# Patient Record
Sex: FEMALE | Race: WHITE | NOT HISPANIC OR LATINO | Employment: PART TIME | URBAN - METROPOLITAN AREA
[De-identification: names, ages, dates, MRNs, and addresses within clinical notes are randomized per-mention and may not be internally consistent; named-entity substitution may affect disease eponyms.]

---

## 2018-02-19 ENCOUNTER — TRANSCRIBE ORDERS (OUTPATIENT)
Dept: ADMINISTRATIVE | Facility: HOSPITAL | Age: 48
End: 2018-02-19

## 2018-02-19 DIAGNOSIS — Z12.31 VISIT FOR SCREENING MAMMOGRAM: Primary | ICD-10-CM

## 2018-02-26 ENCOUNTER — HOSPITAL ENCOUNTER (OUTPATIENT)
Dept: MAMMOGRAPHY | Facility: HOSPITAL | Age: 48
Discharge: HOME | End: 2018-02-26
Admitting: INTERNAL MEDICINE

## 2018-02-26 DIAGNOSIS — Z12.31 VISIT FOR SCREENING MAMMOGRAM: ICD-10-CM

## 2018-02-26 PROCEDURE — 77067 SCR MAMMO BI INCL CAD: CPT

## 2018-02-26 PROCEDURE — 77063 BREAST TOMOSYNTHESIS BI: CPT

## 2018-06-06 ENCOUNTER — TRANSCRIBE ORDERS (OUTPATIENT)
Dept: ADMINISTRATIVE | Facility: HOSPITAL | Age: 48
End: 2018-06-06

## 2018-06-06 DIAGNOSIS — N28.89 RENAL MASS: Primary | ICD-10-CM

## 2018-06-11 ENCOUNTER — HOSPITAL ENCOUNTER (OUTPATIENT)
Dept: CT IMAGING | Facility: HOSPITAL | Age: 48
Discharge: HOME | End: 2018-06-11
Admitting: INTERNAL MEDICINE

## 2018-06-11 DIAGNOSIS — N28.89 RENAL MASS: ICD-10-CM

## 2018-06-11 PROCEDURE — 0 IOPAMIDOL PER 1 ML: Performed by: INTERNAL MEDICINE

## 2018-06-11 PROCEDURE — 74178 CT ABD&PLV WO CNTR FLWD CNTR: CPT

## 2018-06-11 PROCEDURE — 0 DIATRIZOATE MEGLUMINE & SODIUM PER 1 ML: Performed by: INTERNAL MEDICINE

## 2018-06-11 RX ADMIN — DIATRIZOATE MEGLUMINE AND DIATRIZOATE SODIUM 30 ML: 600; 100 SOLUTION ORAL; RECTAL at 11:15

## 2018-06-11 RX ADMIN — IOPAMIDOL 100 ML: 755 INJECTION, SOLUTION INTRAVENOUS at 12:46

## 2018-06-22 ENCOUNTER — TRANSCRIBE ORDERS (OUTPATIENT)
Dept: ADMINISTRATIVE | Facility: HOSPITAL | Age: 48
End: 2018-06-22

## 2018-06-22 DIAGNOSIS — R10.9 ABDOMINAL PAIN, UNSPECIFIED ABDOMINAL LOCATION: Primary | ICD-10-CM

## 2018-06-26 ENCOUNTER — HOSPITAL ENCOUNTER (OUTPATIENT)
Dept: ULTRASOUND IMAGING | Facility: HOSPITAL | Age: 48
Discharge: HOME | End: 2018-06-26
Admitting: INTERNAL MEDICINE

## 2018-06-26 ENCOUNTER — HOSPITAL ENCOUNTER (OUTPATIENT)
Dept: ULTRASOUND IMAGING | Facility: HOSPITAL | Age: 48
Discharge: HOME | End: 2018-06-26

## 2018-06-26 DIAGNOSIS — R10.9 ABDOMINAL PAIN, UNSPECIFIED ABDOMINAL LOCATION: ICD-10-CM

## 2018-06-26 PROCEDURE — 76830 TRANSVAGINAL US NON-OB: CPT

## 2018-06-26 PROCEDURE — 76856 US EXAM PELVIC COMPLETE: CPT

## 2019-10-08 PROBLEM — R39.89 BLADDER PAIN: Status: ACTIVE | Noted: 2019-10-08

## 2019-10-08 PROBLEM — N39.3 FEMALE STRESS INCONTINENCE: Status: ACTIVE | Noted: 2019-10-08

## 2019-10-08 PROBLEM — N81.6 RECTOCELE: Status: ACTIVE | Noted: 2019-10-08

## 2019-10-08 PROBLEM — N81.5 VAGINAL ENTEROCELE: Status: ACTIVE | Noted: 2019-10-08

## 2019-10-08 PROBLEM — N81.89 LOSS OF PERINEAL BODY, FEMALE: Status: ACTIVE | Noted: 2019-10-08

## 2019-10-08 PROBLEM — N81.82 INCOMPETENCE OR WEAKENING OF PUBOCERVICAL TISSUE: Status: ACTIVE | Noted: 2019-10-08

## 2019-10-08 PROBLEM — R15.0 INCOMPLETE DEFECATION: Status: ACTIVE | Noted: 2019-10-08

## 2019-10-08 PROBLEM — N36.9 DISORDER OF URETHRA: Status: ACTIVE | Noted: 2019-10-08

## 2019-10-08 PROBLEM — N81.83 INCOMPETENCE OR WEAKENING OF RECTOVAGINAL TISSUE: Status: ACTIVE | Noted: 2019-10-08

## 2019-10-08 PROBLEM — N94.89 HIGH-TONE PELVIC FLOOR DYSFUNCTION: Status: ACTIVE | Noted: 2019-10-08

## 2019-10-08 PROBLEM — N81.12 CYSTOCELE, LATERAL: Status: ACTIVE | Noted: 2019-10-08

## 2019-10-08 PROBLEM — N30.10 CHRONIC INTERSTITIAL CYSTITIS: Status: ACTIVE | Noted: 2019-10-08

## 2019-10-08 PROBLEM — N99.3 PROLAPSE OF VAGINAL VAULT AFTER HYSTERECTOMY: Status: ACTIVE | Noted: 2019-10-08

## 2019-10-08 PROBLEM — N36.42 INTRINSIC SPHINCTER DEFICIENCY (ISD): Status: ACTIVE | Noted: 2019-10-08

## 2019-10-08 PROBLEM — N81.11 CYSTOCELE, MIDLINE: Status: ACTIVE | Noted: 2019-10-08

## 2019-10-08 PROBLEM — M62.89 HIGH-TONE PELVIC FLOOR DYSFUNCTION: Status: ACTIVE | Noted: 2019-10-08

## 2019-10-08 NOTE — PLAN OF CARE
Laparoscopic uterosacral ligament colpopexy  Laparoscopic paravaginal repair  Laparoscopic abdominal enterocele repair  Pubovaginal sling  Revision/removal of pubovaginal sling  Laparoscopic bilateral salpingectomy  Posterior colporrhaphy  Anterior colporrhaphy  Extraperitoneal colpopexy sacrospinous ligament fixation  Cystourethroscopy  And any other indicated procedures

## 2019-10-08 NOTE — H&P
Female Pelvic Medicine and Reconstructive Surgery   History & Physical    Patient Identification:  Name: Nicki Aguilar Age: 49 y.o. Sex: female :  1970 MRN: Female Pelvic Medicine and Reconstructive Surgery   History & Physical    Patient Identification:  Name: Nicki Aguilar Age: 49 y.o. Sex: female :  1970 MRN: [id number]                            Problem List:    Prolapse of vaginal vault after hysterectomy    Cystocele, midline    Cystocele, lateral    Vaginal enterocele    Rectocele    Loss of perineal body, female    Female stress incontinence    Intrinsic sphincter deficiency (ISD)    Chronic interstitial cystitis    Incomplete defecation    Incompetence or weakening of pubocervical tissue    Incompetence or weakening of rectovaginal tissue    High-tone pelvic floor dysfunction    Bladder pain    Disorder of urethra    Past Medical History:  No past medical history on file.  Past Surgical History:  No past surgical history on file.   Home Meds:  No medications prior to admission.     Current Meds:   No current facility-administered medications for this encounter.   No current outpatient medications on file.  Allergies:  Allergies not on file  Immunizations:    There is no immunization history on file for this patient.  Social History:   Social History     Tobacco Use   • Smoking status: Not on file   Substance Use Topics   • Alcohol use: Not on file      Family History:  Family History   Problem Relation Age of Onset   • Breast cancer Neg Hx         Review of Systems  Constitutional:  Denies fever or chills   Eyes:  Denies change in visual acuity   HEENT:  Denies nasal congestion or sore throat   Respiratory:  Denies cough or shortness of breath   Cardiovascular:  Denies chest pain or edema   GI:  Denies abdominal pain, nausea, vomiting, bloody stools or diarrhea   :  Denies dysuria   Musculoskeletal:  Denies back pain or joint pain   Integument:  Denies rash   Neurologic:  Denies  headache, focal weakness or sensory changes   Endocrine:  Denies polyuria or polydipsia   Lymphatic:  Denies swollen glands   Psychiatric:  Denies depression or anxiety       Objective:  tMax 24 hrs: No data recorded.    Vitals Ranges:   BP: ()/()   Arterial Line BP: ()/()     Exam:  Vital Signs: There were no vitals taken for this visit.  Constitutional:  Well developed, well nourished, no acute distress, non-toxic appearance    GI:  Soft, nondistended, normal bowel sounds, nontender, no organomegaly, no mass, no rebound, no guarding   :  No costovertebral angle tenderness   Musculoskeletal:  No edema, no tenderness, no deformities. Back- no tenderness  Integument:  Well hydrated, no rash   Lymphatic:  No lymphadenopathy noted   Neurologic:  Alert & oriented x 3,  Pelvic:     POPQ  +1  +1  -3  6  2  10  +1  +1  Na    LPP 41 cm H2O  PFS 15.2 ml/s    Assessment:    Prolapse of vaginal vault after hysterectomy    Cystocele, midline    Cystocele, lateral    Vaginal enterocele    Rectocele    Loss of perineal body, female    Female stress incontinence    Intrinsic sphincter deficiency (ISD)    Chronic interstitial cystitis    Incomplete defecation    Incompetence or weakening of pubocervical tissue    Incompetence or weakening of rectovaginal tissue    High-tone pelvic floor dysfunction    Bladder pain    Disorder of urethra      Plan:  Laparoscopic uterosacral ligament colpopexy  Laparoscopic paravaginal repair  Laparoscopic abdominal enterocele repair  Pubovaginal sling  Revision/removal of pubovaginal sling  Laparoscopic bilateral salpingectomy  Posterior colporrhaphy  Anterior colporrhaphy  Extraperitoneal colpopexy sacrospinous ligament fixation  Cystourethroscopy  And any other indicated procedures    Jamila Rod MD  10/8/2019

## 2019-10-10 ENCOUNTER — APPOINTMENT (OUTPATIENT)
Dept: PREADMISSION TESTING | Facility: HOSPITAL | Age: 49
End: 2019-10-10

## 2019-10-10 ENCOUNTER — HOSPITAL ENCOUNTER (OUTPATIENT)
Dept: GENERAL RADIOLOGY | Facility: HOSPITAL | Age: 49
Discharge: HOME | End: 2019-10-10
Admitting: OBSTETRICS & GYNECOLOGY

## 2019-10-10 VITALS
OXYGEN SATURATION: 98 % | HEART RATE: 73 BPM | TEMPERATURE: 97.8 F | DIASTOLIC BLOOD PRESSURE: 75 MMHG | WEIGHT: 213 LBS | RESPIRATION RATE: 16 BRPM | HEIGHT: 64 IN | BODY MASS INDEX: 36.37 KG/M2 | SYSTOLIC BLOOD PRESSURE: 116 MMHG

## 2019-10-10 LAB
ABO GROUP BLD: NORMAL
ANION GAP SERPL CALCULATED.3IONS-SCNC: 14 MMOL/L (ref 5–15)
BASOPHILS # BLD AUTO: 0.03 10*3/MM3 (ref 0–0.2)
BASOPHILS NFR BLD AUTO: 0.6 % (ref 0–1.5)
BILIRUB UR QL STRIP: NEGATIVE
BLD GP AB SCN SERPL QL: NEGATIVE
BUN BLD-MCNC: 14 MG/DL (ref 6–20)
BUN/CREAT SERPL: 18.7 (ref 7–25)
CALCIUM SPEC-SCNC: 8.9 MG/DL (ref 8.6–10.5)
CHLORIDE SERPL-SCNC: 100 MMOL/L (ref 98–107)
CLARITY UR: CLEAR
CO2 SERPL-SCNC: 24 MMOL/L (ref 22–29)
COLOR UR: YELLOW
CREAT BLD-MCNC: 0.75 MG/DL (ref 0.57–1)
DEPRECATED RDW RBC AUTO: 42 FL (ref 37–54)
EOSINOPHIL # BLD AUTO: 0.09 10*3/MM3 (ref 0–0.4)
EOSINOPHIL NFR BLD AUTO: 1.9 % (ref 0.3–6.2)
ERYTHROCYTE [DISTWIDTH] IN BLOOD BY AUTOMATED COUNT: 13.6 % (ref 12.3–15.4)
GFR SERPL CREATININE-BSD FRML MDRD: 82 ML/MIN/1.73
GLUCOSE BLD-MCNC: 111 MG/DL (ref 65–99)
GLUCOSE UR STRIP-MCNC: NEGATIVE MG/DL
HCT VFR BLD AUTO: 38.8 % (ref 34–46.6)
HGB BLD-MCNC: 12.8 G/DL (ref 12–15.9)
HGB UR QL STRIP.AUTO: NEGATIVE
IMM GRANULOCYTES # BLD AUTO: 0.03 10*3/MM3 (ref 0–0.05)
IMM GRANULOCYTES NFR BLD AUTO: 0.6 % (ref 0–0.5)
KETONES UR QL STRIP: NEGATIVE
LEUKOCYTE ESTERASE UR QL STRIP.AUTO: NEGATIVE
LYMPHOCYTES # BLD AUTO: 0.99 10*3/MM3 (ref 0.7–3.1)
LYMPHOCYTES NFR BLD AUTO: 21.2 % (ref 19.6–45.3)
MAGNESIUM SERPL-MCNC: 2.2 MG/DL (ref 1.6–2.6)
MCH RBC QN AUTO: 27.8 PG (ref 26.6–33)
MCHC RBC AUTO-ENTMCNC: 33 G/DL (ref 31.5–35.7)
MCV RBC AUTO: 84.3 FL (ref 79–97)
MONOCYTES # BLD AUTO: 0.28 10*3/MM3 (ref 0.1–0.9)
MONOCYTES NFR BLD AUTO: 6 % (ref 5–12)
NEUTROPHILS # BLD AUTO: 3.25 10*3/MM3 (ref 1.7–7)
NEUTROPHILS NFR BLD AUTO: 69.7 % (ref 42.7–76)
NITRITE UR QL STRIP: NEGATIVE
NRBC BLD AUTO-RTO: 0 /100 WBC (ref 0–0.2)
PH UR STRIP.AUTO: 7 [PH] (ref 5–8)
PLATELET # BLD AUTO: 157 10*3/MM3 (ref 140–450)
PMV BLD AUTO: 11.2 FL (ref 6–12)
POTASSIUM BLD-SCNC: 4.1 MMOL/L (ref 3.5–5.2)
PROT UR QL STRIP: NEGATIVE
RBC # BLD AUTO: 4.6 10*6/MM3 (ref 3.77–5.28)
RH BLD: POSITIVE
SODIUM BLD-SCNC: 138 MMOL/L (ref 136–145)
SP GR UR STRIP: 1.01 (ref 1–1.03)
T&S EXPIRATION DATE: NORMAL
UROBILINOGEN UR QL STRIP: NORMAL
WBC NRBC COR # BLD: 4.67 10*3/MM3 (ref 3.4–10.8)

## 2019-10-10 PROCEDURE — 93005 ELECTROCARDIOGRAM TRACING: CPT

## 2019-10-10 PROCEDURE — 80048 BASIC METABOLIC PNL TOTAL CA: CPT | Performed by: OBSTETRICS & GYNECOLOGY

## 2019-10-10 PROCEDURE — 85025 COMPLETE CBC W/AUTO DIFF WBC: CPT | Performed by: OBSTETRICS & GYNECOLOGY

## 2019-10-10 PROCEDURE — 83735 ASSAY OF MAGNESIUM: CPT | Performed by: OBSTETRICS & GYNECOLOGY

## 2019-10-10 PROCEDURE — 86850 RBC ANTIBODY SCREEN: CPT | Performed by: OBSTETRICS & GYNECOLOGY

## 2019-10-10 PROCEDURE — 93010 ELECTROCARDIOGRAM REPORT: CPT | Performed by: INTERNAL MEDICINE

## 2019-10-10 PROCEDURE — 81003 URINALYSIS AUTO W/O SCOPE: CPT | Performed by: OBSTETRICS & GYNECOLOGY

## 2019-10-10 PROCEDURE — 71046 X-RAY EXAM CHEST 2 VIEWS: CPT

## 2019-10-10 PROCEDURE — 86900 BLOOD TYPING SEROLOGIC ABO: CPT | Performed by: OBSTETRICS & GYNECOLOGY

## 2019-10-10 PROCEDURE — 36415 COLL VENOUS BLD VENIPUNCTURE: CPT

## 2019-10-10 PROCEDURE — 86901 BLOOD TYPING SEROLOGIC RH(D): CPT | Performed by: OBSTETRICS & GYNECOLOGY

## 2019-10-10 RX ORDER — METHENAMINE, SODIUM PHOSPHATE, MONOBASIC, MONOHYDRATE, PHENYL SALICYLATE, METHYLENE BLUE, AND HYOSCYAMINE SULFATE 120; 40.8; 36; 10; .12 MG/1; MG/1; MG/1; MG/1; MG/1
1 CAPSULE ORAL
COMMUNITY

## 2019-10-10 RX ORDER — MELATONIN
1000 EVERY OTHER DAY
COMMUNITY

## 2019-10-10 RX ORDER — CHLORHEXIDINE GLUCONATE 500 MG/1
CLOTH TOPICAL
COMMUNITY
End: 2019-10-17 | Stop reason: HOSPADM

## 2019-10-10 NOTE — DISCHARGE INSTRUCTIONS
Take the following medications the morning of surgery with a small sip of water:    NONE    CALL OFFICE FOR ARRIVAL TIME.    General Instructions:  • Do not eat solid food after midnight the night before surgery.  • You may drink clear liquids day of surgery but must stop at least one hour before your hospital arrival time.  • It is beneficial for you to have a clear drink that contains carbohydrates the day of surgery.  We suggest a 12 to 20 ounce bottle of Gatorade or Powerade for non-diabetic patients or a 12 to 20 ounce bottle of G2 or Powerade Zero for diabetic patients. (Pediatric patients, are not advised to drink a 12 to 20 ounce carbohydrate drink)    Clear liquids are liquids you can see through.  Nothing red in color.     Plain water                               Sports drinks  Sodas                                   Gelatin (Jell-O)  Fruit juices without pulp such as white grape juice and apple juice  Popsicles that contain no fruit or yogurt  Tea or coffee (no cream or milk added)  Gatorade / Powerade  G2 / Powerade Zero    • Infants may have breast milk up to four hours before surgery.  • Infants drinking formula may drink formula up to six hours before surgery.   • Patients who avoid smoking, chewing tobacco and alcohol for 4 weeks prior to surgery have a reduced risk of post-operative complications.  Quit smoking as many days before surgery as you can.  • Do not smoke, use chewing tobacco or drink alcohol the day of surgery.   • If applicable bring your C-PAP/ BI-PAP machine.  • Bring any papers given to you in the doctor’s office.  • Wear clean comfortable clothes.  • Do not wear contact lenses, false eyelashes or make-up.  Bring a case for your glasses.   • Bring crutches or walker if applicable.  • Remove all piercings.  Leave jewelry and any other valuables at home.  • Hair extensions with metal clips must be removed prior to surgery.  • The Pre-Admission Testing nurse will instruct you to bring  medications if unable to obtain an accurate list in Pre-Admission Testing.        If you were given a blood bank ID arm band remember to bring it with you the day of surgery.    Preventing a Surgical Site Infection:  • For 2 to 3 days before surgery, avoid shaving with a razor because the razor can irritate skin and make it easier to develop an infection.    • Any areas of open skin can increase the risk of a post-operative wound infection by allowing bacteria to enter and travel throughout the body.  Notify your surgeon if you have any skin wounds / rashes even if it is not near the expected surgical site.  The area will need assessed to determine if surgery should be delayed until it is healed.  • The night prior to surgery sleep in a clean bed with clean clothing.  Do not allow pets to sleep with you.  • Shower on the morning of surgery using a fresh bar of anti-bacterial soap (such as Dial) and clean washcloth.  Dry with a clean towel and dress in clean clothing.  • Ask your surgeon if you will be receiving antibiotics prior to surgery.  • Make sure you, your family, and all healthcare providers clean their hands with soap and water or an alcohol based hand  before caring for you or your wound.    Day of surgery:  Your arrival time is approximately two hours before your scheduled surgery time.  Upon arrival, a Pre-op nurse and Anesthesiologist will review your health history, obtain vital signs, and answer questions you may have.  The only belongings needed at this time will be a list of your home medications and if applicable your C-PAP/BI-PAP machine.  If you are staying overnight your family can leave the rest of your belongings in the car and bring them to your room later.  A Pre-op nurse will start an IV and you may receive medication in preparation for surgery, including something to help you relax.  Your family will be able to see you in the Pre-op area.  Two visitors at a time will be allowed in  the Pre-op room.  While you are in surgery your family should notify the waiting room  if they leave the waiting room area and provide a contact phone number.    Please be aware that surgery does come with discomfort.  We want to make every effort to control your discomfort so please discuss any uncontrolled symptoms with your nurse.   Your doctor will most likely have prescribed pain medications.      If you are going home after surgery you will receive individualized written care instructions before being discharged.  A responsible adult must drive you to and from the hospital on the day of your surgery and stay with you for 24 hours.    If you are staying overnight following surgery, you will be transported to your hospital room following the recovery period.  [facility] has all private rooms.    You have received a list of surgical assistants for your reference.  If you have any questions please call Pre-Admission Testing at [phone].  Deductibles and co-payments are collected on the day of service. Please be prepared to pay the required co-pay, deductible or deposit on the day of service as defined by your plan.  2% CHLORAHEXIDINE GLUCONATE* CLOTH  Preparing or “prepping” skin before surgery can reduce the risk of infection at the surgical site. To make the process easier, [facility] has chosen disposable cloths moistened with a rinse-free, 2% Chlorhexidine Gluconate (CHG) antiseptic solution. The steps below outline the prepping process and should be carefully followed.        Use the prep cloth on the area that is circled in the diagram             Directions Night before Surgery  1) Shower using a fresh bar of anti-bacterial soap (such as Dial) and clean washcloth.  Use a clean towel to completely dry your skin.  2) Do not use any lotions, oils or creams on your skin.  3) Open the package and remove 1 cloth, wipe your skin for 30 seconds in a circular motion.  Allow  to dry for 3 minutes.  4) Repeat #3 with second cloth.  5) Do not touch your eyes, ears, or mouth with the prep cloth.  6) Allow the wet prep solution to air dry.  7) Discard the prep cloth and wash your hands with soap and water.   8) Dress in clean bed clothes and sleep on fresh clean bed sheets.   9) You may experience some temporary itching after the prep.    Directions Day of Surgery  1) Repeat steps 1,2,3,4,5,6,7, and 9.   2) Dress in clean clothes before coming to the hospital.

## 2019-10-15 ENCOUNTER — ANESTHESIA EVENT (OUTPATIENT)
Dept: PERIOP | Facility: HOSPITAL | Age: 49
End: 2019-10-15

## 2019-10-15 ENCOUNTER — ANESTHESIA (OUTPATIENT)
Dept: PERIOP | Facility: HOSPITAL | Age: 49
End: 2019-10-15

## 2019-10-15 ENCOUNTER — HOSPITAL ENCOUNTER (INPATIENT)
Facility: HOSPITAL | Age: 49
LOS: 2 days | Discharge: HOME | End: 2019-10-17
Attending: OBSTETRICS & GYNECOLOGY | Admitting: OBSTETRICS & GYNECOLOGY

## 2019-10-15 DIAGNOSIS — N83.8 CYST OF FALLOPIAN TUBE: ICD-10-CM

## 2019-10-15 PROBLEM — N81.9 VAGINAL VAULT PROLAPSE: Status: ACTIVE | Noted: 2019-10-15

## 2019-10-15 PROCEDURE — 0UPH4JZ REMOVAL OF SYNTHETIC SUBSTITUTE FROM VAGINA AND CUL-DE-SAC, PERCUTANEOUS ENDOSCOPIC APPROACH: ICD-10-PCS | Performed by: OBSTETRICS & GYNECOLOGY

## 2019-10-15 PROCEDURE — 88300 SURGICAL PATH GROSS: CPT | Performed by: OBSTETRICS & GYNECOLOGY

## 2019-10-15 PROCEDURE — 25010000002 PROPOFOL 10 MG/ML EMULSION: Performed by: ANESTHESIOLOGY

## 2019-10-15 PROCEDURE — 25010000003 CEFAZOLIN IN DEXTROSE 2-4 GM/100ML-% SOLUTION: Performed by: OBSTETRICS & GYNECOLOGY

## 2019-10-15 PROCEDURE — 88304 TISSUE EXAM BY PATHOLOGIST: CPT | Performed by: OBSTETRICS & GYNECOLOGY

## 2019-10-15 PROCEDURE — 0UT74ZZ RESECTION OF BILATERAL FALLOPIAN TUBES, PERCUTANEOUS ENDOSCOPIC APPROACH: ICD-10-PCS | Performed by: OBSTETRICS & GYNECOLOGY

## 2019-10-15 PROCEDURE — 25010000002 HYDROMORPHONE PER 4 MG: Performed by: NURSE ANESTHETIST, CERTIFIED REGISTERED

## 2019-10-15 PROCEDURE — 25010000002 KETOROLAC TROMETHAMINE PER 15 MG: Performed by: NURSE ANESTHETIST, CERTIFIED REGISTERED

## 2019-10-15 PROCEDURE — 25010000002 MIDAZOLAM PER 1 MG: Performed by: ANESTHESIOLOGY

## 2019-10-15 PROCEDURE — G0378 HOSPITAL OBSERVATION PER HR: HCPCS

## 2019-10-15 PROCEDURE — 25010000002 FENTANYL CITRATE (PF) 100 MCG/2ML SOLUTION: Performed by: ANESTHESIOLOGY

## 2019-10-15 PROCEDURE — 25010000002 SUCCINYLCHOLINE PER 20 MG: Performed by: ANESTHESIOLOGY

## 2019-10-15 PROCEDURE — 0UQF4ZZ REPAIR CUL-DE-SAC, PERCUTANEOUS ENDOSCOPIC APPROACH: ICD-10-PCS | Performed by: OBSTETRICS & GYNECOLOGY

## 2019-10-15 PROCEDURE — 0JQC0ZZ REPAIR PELVIC REGION SUBCUTANEOUS TISSUE AND FASCIA, OPEN APPROACH: ICD-10-PCS | Performed by: OBSTETRICS & GYNECOLOGY

## 2019-10-15 PROCEDURE — 25010000002 NEOSTIGMINE PER 0.5 MG: Performed by: NURSE ANESTHETIST, CERTIFIED REGISTERED

## 2019-10-15 PROCEDURE — 25010000002 MORPHINE PER 10 MG: Performed by: OBSTETRICS & GYNECOLOGY

## 2019-10-15 PROCEDURE — 25010000002 DEXAMETHASONE PER 1 MG: Performed by: NURSE ANESTHETIST, CERTIFIED REGISTERED

## 2019-10-15 PROCEDURE — 0TSD4ZZ REPOSITION URETHRA, PERCUTANEOUS ENDOSCOPIC APPROACH: ICD-10-PCS | Performed by: OBSTETRICS & GYNECOLOGY

## 2019-10-15 PROCEDURE — 25010000002 ONDANSETRON PER 1 MG: Performed by: NURSE ANESTHETIST, CERTIFIED REGISTERED

## 2019-10-15 PROCEDURE — 0USG4ZZ REPOSITION VAGINA, PERCUTANEOUS ENDOSCOPIC APPROACH: ICD-10-PCS | Performed by: OBSTETRICS & GYNECOLOGY

## 2019-10-15 PROCEDURE — 0TJB8ZZ INSPECTION OF BLADDER, VIA NATURAL OR ARTIFICIAL OPENING ENDOSCOPIC: ICD-10-PCS | Performed by: OBSTETRICS & GYNECOLOGY

## 2019-10-15 PROCEDURE — 25010000002 FENTANYL CITRATE (PF) 100 MCG/2ML SOLUTION: Performed by: NURSE ANESTHETIST, CERTIFIED REGISTERED

## 2019-10-15 DEVICE — GRFT TISS STRATTICE FIRM 6X10CM: Type: IMPLANTABLE DEVICE | Status: FUNCTIONAL

## 2019-10-15 RX ORDER — MAGNESIUM HYDROXIDE 1200 MG/15ML
LIQUID ORAL AS NEEDED
Status: DISCONTINUED | OUTPATIENT
Start: 2019-10-15 | End: 2019-10-15 | Stop reason: HOSPADM

## 2019-10-15 RX ORDER — KETOROLAC TROMETHAMINE 30 MG/ML
INJECTION, SOLUTION INTRAMUSCULAR; INTRAVENOUS AS NEEDED
Status: DISCONTINUED | OUTPATIENT
Start: 2019-10-15 | End: 2019-10-15 | Stop reason: SURG

## 2019-10-15 RX ORDER — PHENAZOPYRIDINE HYDROCHLORIDE 200 MG/1
200 TABLET, FILM COATED ORAL ONCE
Status: COMPLETED | OUTPATIENT
Start: 2019-10-15 | End: 2019-10-15

## 2019-10-15 RX ORDER — SODIUM CHLORIDE 0.9 % (FLUSH) 0.9 %
3 SYRINGE (ML) INJECTION EVERY 12 HOURS SCHEDULED
Status: DISCONTINUED | OUTPATIENT
Start: 2019-10-15 | End: 2019-10-15 | Stop reason: HOSPADM

## 2019-10-15 RX ORDER — GLYCOPYRROLATE 0.2 MG/ML
INJECTION INTRAMUSCULAR; INTRAVENOUS AS NEEDED
Status: DISCONTINUED | OUTPATIENT
Start: 2019-10-15 | End: 2019-10-15 | Stop reason: SURG

## 2019-10-15 RX ORDER — IBUPROFEN 400 MG/1
400 TABLET ORAL
Status: DISCONTINUED | OUTPATIENT
Start: 2019-10-15 | End: 2019-10-17 | Stop reason: HOSPADM

## 2019-10-15 RX ORDER — FLUMAZENIL 0.1 MG/ML
0.2 INJECTION INTRAVENOUS AS NEEDED
Status: DISCONTINUED | OUTPATIENT
Start: 2019-10-15 | End: 2019-10-15 | Stop reason: HOSPADM

## 2019-10-15 RX ORDER — DIPHENHYDRAMINE HYDROCHLORIDE 50 MG/ML
12.5 INJECTION INTRAMUSCULAR; INTRAVENOUS
Status: DISCONTINUED | OUTPATIENT
Start: 2019-10-15 | End: 2019-10-15 | Stop reason: HOSPADM

## 2019-10-15 RX ORDER — MIDAZOLAM HYDROCHLORIDE 1 MG/ML
1 INJECTION INTRAMUSCULAR; INTRAVENOUS
Status: DISCONTINUED | OUTPATIENT
Start: 2019-10-15 | End: 2019-10-15 | Stop reason: HOSPADM

## 2019-10-15 RX ORDER — METHENAMINE, SODIUM PHOSPHATE, MONOBASIC, MONOHYDRATE, PHENYL SALICYLATE, METHYLENE BLUE, AND HYOSCYAMINE SULFATE 120; 40.8; 36; 10; .12 MG/1; MG/1; MG/1; MG/1; MG/1
1 CAPSULE ORAL
Status: DISCONTINUED | OUTPATIENT
Start: 2019-10-15 | End: 2019-10-16

## 2019-10-15 RX ORDER — SODIUM CHLORIDE 0.9 % (FLUSH) 0.9 %
3 SYRINGE (ML) INJECTION EVERY 12 HOURS SCHEDULED
Status: DISCONTINUED | OUTPATIENT
Start: 2019-10-15 | End: 2019-10-15

## 2019-10-15 RX ORDER — PROMETHAZINE HYDROCHLORIDE 25 MG/ML
12.5 INJECTION, SOLUTION INTRAMUSCULAR; INTRAVENOUS EVERY 6 HOURS PRN
Status: DISCONTINUED | OUTPATIENT
Start: 2019-10-15 | End: 2019-10-17 | Stop reason: HOSPADM

## 2019-10-15 RX ORDER — LIDOCAINE HYDROCHLORIDE 20 MG/ML
INJECTION, SOLUTION INFILTRATION; PERINEURAL AS NEEDED
Status: DISCONTINUED | OUTPATIENT
Start: 2019-10-15 | End: 2019-10-15 | Stop reason: SURG

## 2019-10-15 RX ORDER — PROMETHAZINE HYDROCHLORIDE 25 MG/1
25 TABLET ORAL ONCE AS NEEDED
Status: DISCONTINUED | OUTPATIENT
Start: 2019-10-15 | End: 2019-10-15 | Stop reason: HOSPADM

## 2019-10-15 RX ORDER — ROCURONIUM BROMIDE 10 MG/ML
INJECTION, SOLUTION INTRAVENOUS AS NEEDED
Status: DISCONTINUED | OUTPATIENT
Start: 2019-10-15 | End: 2019-10-15 | Stop reason: SURG

## 2019-10-15 RX ORDER — DIPHENHYDRAMINE HCL 25 MG
25 CAPSULE ORAL
Status: DISCONTINUED | OUTPATIENT
Start: 2019-10-15 | End: 2019-10-15 | Stop reason: HOSPADM

## 2019-10-15 RX ORDER — FENTANYL CITRATE 50 UG/ML
50 INJECTION, SOLUTION INTRAMUSCULAR; INTRAVENOUS
Status: DISCONTINUED | OUTPATIENT
Start: 2019-10-15 | End: 2019-10-15 | Stop reason: HOSPADM

## 2019-10-15 RX ORDER — OXYCODONE AND ACETAMINOPHEN 7.5; 325 MG/1; MG/1
1 TABLET ORAL ONCE AS NEEDED
Status: DISCONTINUED | OUTPATIENT
Start: 2019-10-15 | End: 2019-10-15 | Stop reason: HOSPADM

## 2019-10-15 RX ORDER — PHENAZOPYRIDINE HYDROCHLORIDE 200 MG/1
200 TABLET, FILM COATED ORAL ONCE
Status: DISCONTINUED | OUTPATIENT
Start: 2019-10-15 | End: 2019-10-15

## 2019-10-15 RX ORDER — ONDANSETRON 2 MG/ML
4 INJECTION INTRAMUSCULAR; INTRAVENOUS ONCE AS NEEDED
Status: DISCONTINUED | OUTPATIENT
Start: 2019-10-15 | End: 2019-10-15 | Stop reason: HOSPADM

## 2019-10-15 RX ORDER — CEFAZOLIN SODIUM 2 G/100ML
2 INJECTION, SOLUTION INTRAVENOUS ONCE
Status: COMPLETED | OUTPATIENT
Start: 2019-10-15 | End: 2019-10-15

## 2019-10-15 RX ORDER — MEPERIDINE HYDROCHLORIDE 25 MG/ML
12.5 INJECTION INTRAMUSCULAR; INTRAVENOUS; SUBCUTANEOUS
Status: DISCONTINUED | OUTPATIENT
Start: 2019-10-15 | End: 2019-10-15 | Stop reason: HOSPADM

## 2019-10-15 RX ORDER — LIDOCAINE HYDROCHLORIDE 10 MG/ML
0.5 INJECTION, SOLUTION EPIDURAL; INFILTRATION; INTRACAUDAL; PERINEURAL ONCE AS NEEDED
Status: DISCONTINUED | OUTPATIENT
Start: 2019-10-15 | End: 2019-10-15 | Stop reason: HOSPADM

## 2019-10-15 RX ORDER — LEVOFLOXACIN 5 MG/ML
500 INJECTION, SOLUTION INTRAVENOUS ONCE
Status: DISCONTINUED | OUTPATIENT
Start: 2019-10-15 | End: 2019-10-15

## 2019-10-15 RX ORDER — FENTANYL CITRATE 50 UG/ML
INJECTION, SOLUTION INTRAMUSCULAR; INTRAVENOUS AS NEEDED
Status: DISCONTINUED | OUTPATIENT
Start: 2019-10-15 | End: 2019-10-15 | Stop reason: SURG

## 2019-10-15 RX ORDER — SODIUM CHLORIDE 0.9 % (FLUSH) 0.9 %
10 SYRINGE (ML) INJECTION AS NEEDED
Status: DISCONTINUED | OUTPATIENT
Start: 2019-10-15 | End: 2019-10-15

## 2019-10-15 RX ORDER — DEXAMETHASONE SODIUM PHOSPHATE 10 MG/ML
INJECTION INTRAMUSCULAR; INTRAVENOUS AS NEEDED
Status: DISCONTINUED | OUTPATIENT
Start: 2019-10-15 | End: 2019-10-15 | Stop reason: SURG

## 2019-10-15 RX ORDER — NALOXONE HCL 0.4 MG/ML
0.4 VIAL (ML) INJECTION
Status: DISCONTINUED | OUTPATIENT
Start: 2019-10-15 | End: 2019-10-17 | Stop reason: HOSPADM

## 2019-10-15 RX ORDER — SODIUM CHLORIDE 0.9 % (FLUSH) 0.9 %
3-10 SYRINGE (ML) INJECTION AS NEEDED
Status: DISCONTINUED | OUTPATIENT
Start: 2019-10-15 | End: 2019-10-15 | Stop reason: HOSPADM

## 2019-10-15 RX ORDER — HYDROCODONE BITARTRATE AND ACETAMINOPHEN 7.5; 325 MG/1; MG/1
1 TABLET ORAL ONCE AS NEEDED
Status: DISCONTINUED | OUTPATIENT
Start: 2019-10-15 | End: 2019-10-15 | Stop reason: HOSPADM

## 2019-10-15 RX ORDER — SODIUM CHLORIDE 0.9 % (FLUSH) 0.9 %
10 SYRINGE (ML) INJECTION AS NEEDED
Status: DISCONTINUED | OUTPATIENT
Start: 2019-10-15 | End: 2019-10-15 | Stop reason: HOSPADM

## 2019-10-15 RX ORDER — MORPHINE SULFATE 2 MG/ML
1 INJECTION, SOLUTION INTRAMUSCULAR; INTRAVENOUS
Status: DISCONTINUED | OUTPATIENT
Start: 2019-10-15 | End: 2019-10-17 | Stop reason: HOSPADM

## 2019-10-15 RX ORDER — ESTRADIOL 0.1 MG/G
CREAM VAGINAL AS NEEDED
Status: DISCONTINUED | OUTPATIENT
Start: 2019-10-15 | End: 2019-10-15 | Stop reason: HOSPADM

## 2019-10-15 RX ORDER — DOCUSATE SODIUM 100 MG/1
100 CAPSULE, LIQUID FILLED ORAL 2 TIMES DAILY
Status: DISCONTINUED | OUTPATIENT
Start: 2019-10-15 | End: 2019-10-17 | Stop reason: HOSPADM

## 2019-10-15 RX ORDER — PROMETHAZINE HYDROCHLORIDE 25 MG/1
25 SUPPOSITORY RECTAL ONCE AS NEEDED
Status: DISCONTINUED | OUTPATIENT
Start: 2019-10-15 | End: 2019-10-15 | Stop reason: HOSPADM

## 2019-10-15 RX ORDER — PROPOFOL 10 MG/ML
VIAL (ML) INTRAVENOUS AS NEEDED
Status: DISCONTINUED | OUTPATIENT
Start: 2019-10-15 | End: 2019-10-15 | Stop reason: SURG

## 2019-10-15 RX ORDER — MORPHINE SULFATE 2 MG/ML
2 INJECTION, SOLUTION INTRAMUSCULAR; INTRAVENOUS
Status: DISCONTINUED | OUTPATIENT
Start: 2019-10-15 | End: 2019-10-17 | Stop reason: HOSPADM

## 2019-10-15 RX ORDER — FAMOTIDINE 10 MG/ML
20 INJECTION, SOLUTION INTRAVENOUS ONCE
Status: COMPLETED | OUTPATIENT
Start: 2019-10-15 | End: 2019-10-15

## 2019-10-15 RX ORDER — PROMETHAZINE HYDROCHLORIDE 25 MG/ML
12.5 INJECTION, SOLUTION INTRAMUSCULAR; INTRAVENOUS ONCE AS NEEDED
Status: DISCONTINUED | OUTPATIENT
Start: 2019-10-15 | End: 2019-10-15 | Stop reason: HOSPADM

## 2019-10-15 RX ORDER — OXYCODONE AND ACETAMINOPHEN 10; 325 MG/1; MG/1
1 TABLET ORAL EVERY 4 HOURS PRN
Status: DISCONTINUED | OUTPATIENT
Start: 2019-10-15 | End: 2019-10-17 | Stop reason: HOSPADM

## 2019-10-15 RX ORDER — CLINDAMYCIN PHOSPHATE 900 MG/50ML
900 INJECTION INTRAVENOUS ONCE
Status: DISCONTINUED | OUTPATIENT
Start: 2019-10-15 | End: 2019-10-15

## 2019-10-15 RX ORDER — MELATONIN
1000 EVERY OTHER DAY
Status: DISCONTINUED | OUTPATIENT
Start: 2019-10-15 | End: 2019-10-17 | Stop reason: HOSPADM

## 2019-10-15 RX ORDER — CEFAZOLIN SODIUM 2 G/100ML
2 INJECTION, SOLUTION INTRAVENOUS EVERY 8 HOURS
Status: COMPLETED | OUTPATIENT
Start: 2019-10-15 | End: 2019-10-16

## 2019-10-15 RX ORDER — ONDANSETRON 2 MG/ML
INJECTION INTRAMUSCULAR; INTRAVENOUS AS NEEDED
Status: DISCONTINUED | OUTPATIENT
Start: 2019-10-15 | End: 2019-10-15 | Stop reason: SURG

## 2019-10-15 RX ORDER — HYDRALAZINE HYDROCHLORIDE 20 MG/ML
5 INJECTION INTRAMUSCULAR; INTRAVENOUS
Status: DISCONTINUED | OUTPATIENT
Start: 2019-10-15 | End: 2019-10-15 | Stop reason: HOSPADM

## 2019-10-15 RX ORDER — ACETAMINOPHEN 325 MG/1
650 TABLET ORAL ONCE AS NEEDED
Status: DISCONTINUED | OUTPATIENT
Start: 2019-10-15 | End: 2019-10-15 | Stop reason: HOSPADM

## 2019-10-15 RX ORDER — ALBUTEROL SULFATE 2.5 MG/3ML
2.5 SOLUTION RESPIRATORY (INHALATION) ONCE AS NEEDED
Status: DISCONTINUED | OUTPATIENT
Start: 2019-10-15 | End: 2019-10-15 | Stop reason: HOSPADM

## 2019-10-15 RX ORDER — ONDANSETRON 4 MG/1
4 TABLET, FILM COATED ORAL EVERY 6 HOURS PRN
Status: DISCONTINUED | OUTPATIENT
Start: 2019-10-15 | End: 2019-10-17 | Stop reason: HOSPADM

## 2019-10-15 RX ORDER — NALOXONE HCL 0.4 MG/ML
0.2 VIAL (ML) INJECTION AS NEEDED
Status: DISCONTINUED | OUTPATIENT
Start: 2019-10-15 | End: 2019-10-15 | Stop reason: HOSPADM

## 2019-10-15 RX ORDER — HYDROMORPHONE HCL 110MG/55ML
PATIENT CONTROLLED ANALGESIA SYRINGE INTRAVENOUS AS NEEDED
Status: DISCONTINUED | OUTPATIENT
Start: 2019-10-15 | End: 2019-10-15 | Stop reason: SURG

## 2019-10-15 RX ORDER — ONDANSETRON 2 MG/ML
4 INJECTION INTRAMUSCULAR; INTRAVENOUS EVERY 6 HOURS PRN
Status: DISCONTINUED | OUTPATIENT
Start: 2019-10-15 | End: 2019-10-17 | Stop reason: HOSPADM

## 2019-10-15 RX ORDER — HYDROMORPHONE HYDROCHLORIDE 1 MG/ML
0.5 INJECTION, SOLUTION INTRAMUSCULAR; INTRAVENOUS; SUBCUTANEOUS
Status: DISCONTINUED | OUTPATIENT
Start: 2019-10-15 | End: 2019-10-15 | Stop reason: HOSPADM

## 2019-10-15 RX ORDER — MIDAZOLAM HYDROCHLORIDE 1 MG/ML
2 INJECTION INTRAMUSCULAR; INTRAVENOUS
Status: DISCONTINUED | OUTPATIENT
Start: 2019-10-15 | End: 2019-10-15 | Stop reason: HOSPADM

## 2019-10-15 RX ORDER — OXYCODONE HYDROCHLORIDE AND ACETAMINOPHEN 5; 325 MG/1; MG/1
1 TABLET ORAL EVERY 4 HOURS PRN
Status: DISCONTINUED | OUTPATIENT
Start: 2019-10-15 | End: 2019-10-17 | Stop reason: HOSPADM

## 2019-10-15 RX ORDER — BUPIVACAINE HYDROCHLORIDE AND EPINEPHRINE 2.5; 5 MG/ML; UG/ML
INJECTION, SOLUTION INFILTRATION; PERINEURAL AS NEEDED
Status: DISCONTINUED | OUTPATIENT
Start: 2019-10-15 | End: 2019-10-15 | Stop reason: HOSPADM

## 2019-10-15 RX ORDER — PROMETHAZINE HYDROCHLORIDE 25 MG/ML
6.25 INJECTION, SOLUTION INTRAMUSCULAR; INTRAVENOUS
Status: DISCONTINUED | OUTPATIENT
Start: 2019-10-15 | End: 2019-10-15 | Stop reason: HOSPADM

## 2019-10-15 RX ORDER — SODIUM CHLORIDE, SODIUM LACTATE, POTASSIUM CHLORIDE, CALCIUM CHLORIDE 600; 310; 30; 20 MG/100ML; MG/100ML; MG/100ML; MG/100ML
9 INJECTION, SOLUTION INTRAVENOUS CONTINUOUS
Status: DISCONTINUED | OUTPATIENT
Start: 2019-10-15 | End: 2019-10-17 | Stop reason: HOSPADM

## 2019-10-15 RX ORDER — DEXTROSE AND SODIUM CHLORIDE 5; .45 G/100ML; G/100ML
100 INJECTION, SOLUTION INTRAVENOUS CONTINUOUS
Status: DISCONTINUED | OUTPATIENT
Start: 2019-10-15 | End: 2019-10-17 | Stop reason: HOSPADM

## 2019-10-15 RX ORDER — EPHEDRINE SULFATE 50 MG/ML
5 INJECTION, SOLUTION INTRAVENOUS ONCE AS NEEDED
Status: DISCONTINUED | OUTPATIENT
Start: 2019-10-15 | End: 2019-10-15 | Stop reason: HOSPADM

## 2019-10-15 RX ORDER — SUCCINYLCHOLINE CHLORIDE 20 MG/ML
INJECTION INTRAMUSCULAR; INTRAVENOUS AS NEEDED
Status: DISCONTINUED | OUTPATIENT
Start: 2019-10-15 | End: 2019-10-15 | Stop reason: SURG

## 2019-10-15 RX ORDER — SODIUM CHLORIDE 9 MG/ML
INJECTION, SOLUTION INTRAVENOUS AS NEEDED
Status: DISCONTINUED | OUTPATIENT
Start: 2019-10-15 | End: 2019-10-15 | Stop reason: HOSPADM

## 2019-10-15 RX ADMIN — SODIUM CHLORIDE, POTASSIUM CHLORIDE, SODIUM LACTATE AND CALCIUM CHLORIDE 9 ML/HR: 600; 310; 30; 20 INJECTION, SOLUTION INTRAVENOUS at 08:47

## 2019-10-15 RX ADMIN — CEFAZOLIN SODIUM 2 G: 2 INJECTION, SOLUTION INTRAVENOUS at 10:48

## 2019-10-15 RX ADMIN — PROPOFOL 180 MG: 10 INJECTION, EMULSION INTRAVENOUS at 10:57

## 2019-10-15 RX ADMIN — METHENAMINE, SODIUM PHOSPHATE, MONOBASIC, MONOHYDRATE, PHENYL SALICYLATE, METHYLENE BLUE, AND HYOSCYAMINE SULFATE 118 MG: 120; 40.8; 36; 10; .12 CAPSULE ORAL at 21:38

## 2019-10-15 RX ADMIN — MIDAZOLAM 1 MG: 1 INJECTION INTRAMUSCULAR; INTRAVENOUS at 08:47

## 2019-10-15 RX ADMIN — FENTANYL CITRATE 50 MCG: 50 INJECTION INTRAMUSCULAR; INTRAVENOUS at 17:23

## 2019-10-15 RX ADMIN — DEXTROSE AND SODIUM CHLORIDE 100 ML/HR: 5; 450 INJECTION, SOLUTION INTRAVENOUS at 21:38

## 2019-10-15 RX ADMIN — FAMOTIDINE 20 MG: 10 INJECTION INTRAVENOUS at 08:47

## 2019-10-15 RX ADMIN — SODIUM CHLORIDE, POTASSIUM CHLORIDE, SODIUM LACTATE AND CALCIUM CHLORIDE: 600; 310; 30; 20 INJECTION, SOLUTION INTRAVENOUS at 12:28

## 2019-10-15 RX ADMIN — CEFAZOLIN SODIUM 2 G: 2 INJECTION, SOLUTION INTRAVENOUS at 23:15

## 2019-10-15 RX ADMIN — FENTANYL CITRATE 50 MCG: 50 INJECTION INTRAMUSCULAR; INTRAVENOUS at 11:05

## 2019-10-15 RX ADMIN — NEOSTIGMINE METHYLSULFATE 2 MG: 1 INJECTION INTRAMUSCULAR; INTRAVENOUS; SUBCUTANEOUS at 16:01

## 2019-10-15 RX ADMIN — ROCURONIUM BROMIDE 45 MG: 10 INJECTION INTRAVENOUS at 11:05

## 2019-10-15 RX ADMIN — MORPHINE SULFATE 1 MG: 2 INJECTION, SOLUTION INTRAMUSCULAR; INTRAVENOUS at 22:21

## 2019-10-15 RX ADMIN — ROCURONIUM BROMIDE 5 MG: 10 INJECTION INTRAVENOUS at 10:57

## 2019-10-15 RX ADMIN — GLYCOPYRROLATE 0.4 MG: 0.2 INJECTION INTRAMUSCULAR; INTRAVENOUS at 15:59

## 2019-10-15 RX ADMIN — GLYCOPYRROLATE 0.2 MG: 0.2 INJECTION INTRAMUSCULAR; INTRAVENOUS at 10:50

## 2019-10-15 RX ADMIN — DEXAMETHASONE SODIUM PHOSPHATE 8 MG: 10 INJECTION INTRAMUSCULAR; INTRAVENOUS at 11:35

## 2019-10-15 RX ADMIN — CEFAZOLIN SODIUM 2 G: 2 INJECTION, SOLUTION INTRAVENOUS at 15:30

## 2019-10-15 RX ADMIN — HYDROMORPHONE HYDROCHLORIDE 0.5 MG: 2 INJECTION INTRAMUSCULAR; INTRAVENOUS; SUBCUTANEOUS at 13:55

## 2019-10-15 RX ADMIN — PROPOFOL 20 MG: 10 INJECTION, EMULSION INTRAVENOUS at 11:05

## 2019-10-15 RX ADMIN — SUCCINYLCHOLINE CHLORIDE 120 MG: 20 INJECTION, SOLUTION INTRAMUSCULAR; INTRAVENOUS; PARENTERAL at 10:57

## 2019-10-15 RX ADMIN — ONDANSETRON 4 MG: 2 INJECTION INTRAMUSCULAR; INTRAVENOUS at 15:54

## 2019-10-15 RX ADMIN — OXYCODONE HYDROCHLORIDE AND ACETAMINOPHEN 1 TABLET: 10; 325 TABLET ORAL at 21:38

## 2019-10-15 RX ADMIN — FENTANYL CITRATE 50 MCG: 50 INJECTION INTRAMUSCULAR; INTRAVENOUS at 17:10

## 2019-10-15 RX ADMIN — HYDROMORPHONE HYDROCHLORIDE 0.5 MG: 2 INJECTION INTRAMUSCULAR; INTRAVENOUS; SUBCUTANEOUS at 15:36

## 2019-10-15 RX ADMIN — ROCURONIUM BROMIDE 20 MG: 10 INJECTION INTRAVENOUS at 15:15

## 2019-10-15 RX ADMIN — IBUPROFEN 400 MG: 400 TABLET, FILM COATED ORAL at 19:45

## 2019-10-15 RX ADMIN — LIDOCAINE HYDROCHLORIDE 100 MG: 20 INJECTION, SOLUTION INFILTRATION; PERINEURAL at 10:57

## 2019-10-15 RX ADMIN — ROCURONIUM BROMIDE 20 MG: 10 INJECTION INTRAVENOUS at 13:34

## 2019-10-15 RX ADMIN — MORPHINE SULFATE 2 MG: 2 INJECTION, SOLUTION INTRAMUSCULAR; INTRAVENOUS at 18:44

## 2019-10-15 RX ADMIN — FENTANYL CITRATE 50 MCG: 50 INJECTION INTRAMUSCULAR; INTRAVENOUS at 10:57

## 2019-10-15 RX ADMIN — KETOROLAC TROMETHAMINE 30 MG: 30 INJECTION, SOLUTION INTRAMUSCULAR; INTRAVENOUS at 16:06

## 2019-10-15 RX ADMIN — DOCUSATE SODIUM 100 MG: 100 CAPSULE, LIQUID FILLED ORAL at 21:38

## 2019-10-15 RX ADMIN — ROCURONIUM BROMIDE 20 MG: 10 INJECTION INTRAVENOUS at 12:08

## 2019-10-15 RX ADMIN — PHENAZOPYRIDINE 200 MG: 200 TABLET ORAL at 08:34

## 2019-10-15 NOTE — ANESTHESIA PROCEDURE NOTES
Airway  Urgency: elective    Date/Time: 10/15/2019 10:59 AM  Airway not difficult    General Information and Staff    Patient location during procedure: OR  Anesthesiologist: Isaac Campa MD  CRNA: Kayden Langston CRNA    Indications and Patient Condition  Indications for airway management: airway protection    Preoxygenated: yes  MILS maintained throughout  Mask difficulty assessment: 1 - vent by mask    Final Airway Details  Final airway type: endotracheal airway      Successful airway: ETT  Cuffed: yes   Successful intubation technique: direct laryngoscopy  Endotracheal tube insertion site: oral  Blade: Reg  Blade size: 3  ETT size (mm): 7.0  Cormack-Lehane Classification: grade I - full view of glottis  Placement verified by: chest auscultation and capnometry   Inital cuff pressure (cm H2O): 22  Cuff volume (mL): 7  Measured from: lips  ETT/EBT  to lips (cm): 21  Number of attempts at approach: 1

## 2019-10-15 NOTE — ANESTHESIA PREPROCEDURE EVALUATION
Anesthesia Evaluation     Patient summary reviewed and Nursing notes reviewed   no history of anesthetic complications:  NPO Solid Status: > 8 hours  NPO Liquid Status: > 2 hours           Airway   Mallampati: II  TM distance: >3 FB  Neck ROM: full  No difficulty expected  Dental - normal exam         Pulmonary - negative pulmonary ROS and normal exam    breath sounds clear to auscultation  Cardiovascular - negative cardio ROS and normal exam    Rhythm: regular  Rate: normal        Neuro/Psych- negative ROS  GI/Hepatic/Renal/Endo    (+) obesity,       Musculoskeletal (-) negative ROS    Abdominal   (+) obese,    Substance History - negative use     OB/GYN          Other - negative ROS                       Anesthesia Plan    ASA 2     general     intravenous induction   Anesthetic plan, all risks, benefits, and alternatives have been provided, discussed and informed consent has been obtained with: patient.

## 2019-10-15 NOTE — H&P
Female Pelvic Medicine and Reconstructive Surgery   History & Physical    Patient Identification:  Name: Nicki Aguilar Age: 49 y.o. Sex: female :  1970 MRN: Female Pelvic Medicine and Reconstructive Surgery   History & Physical    Patient Identification:  Name: Nicki Aguilar Age: 49 y.o. Sex: female :  1970 MRN: [id number]                            Problem List:    Prolapse of vaginal vault after hysterectomy    Cystocele, midline    Cystocele, lateral    Vaginal enterocele    Rectocele    Loss of perineal body, female    Female stress incontinence    Intrinsic sphincter deficiency (ISD)    Chronic interstitial cystitis    Incomplete defecation    Incompetence or weakening of pubocervical tissue    Incompetence or weakening of rectovaginal tissue    High-tone pelvic floor dysfunction    Bladder pain    Disorder of urethra    Vaginal vault prolapse    Past Medical History:  Past Medical History:   Diagnosis Date   • Female bladder prolapse      Past Surgical History:  Past Surgical History:   Procedure Laterality Date   • BLADDER SUSPENSION     • HYSTERECTOMY     • INCONTINENCE SURGERY     • TUBAL ABDOMINAL LIGATION        Home Meds:  Medications Prior to Admission   Medication Sig Dispense Refill Last Dose   • Chlorhexidine Gluconate Cloth 2 % pads Apply  topically. As directed pre op   10/15/2019 at 0630   • cholecalciferol (VITAMIN D3) 1000 units tablet Take 1,000 Units by mouth Every Other Day.   10/12/2019   • lisdexamfetamine (VYVANSE) 30 MG capsule Take 30 mg by mouth Daily As Needed TO HOD 3 DAYS BEFORE SURGERY   10/12/2019   • uribel (URO-MP) 118 MG capsule capsule Take 1 capsule by mouth 2 (Two) Times a Day.   10/12/2019 at 1900     Current Meds:     Current Facility-Administered Medications:   •  ceFAZolin in dextrose (ANCEF) IVPB solution 2 g, 2 g, Intravenous, Once, Jamila Rod MD  •  lactated ringers infusion, 9 mL/hr, Intravenous, Continuous, Candida So MD, Last  Rate: 9 mL/hr at 10/15/19 0847, 9 mL/hr at 10/15/19 0847  •  lidocaine PF 1% (XYLOCAINE) injection 0.5 mL, 0.5 mL, Injection, Once PRN, Candida So MD  •  midazolam (VERSED) injection 1 mg, 1 mg, Intravenous, Q5 Min PRN, 1 mg at 10/15/19 0847 **OR** midazolam (VERSED) injection 2 mg, 2 mg, Intravenous, Q5 Min PRN, Candida So MD  •  sodium chloride 0.9 % flush 10 mL, 10 mL, Intravenous, PRN, Jamila Rod MD  •  sodium chloride 0.9 % flush 3 mL, 3 mL, Intravenous, Q12H, Jamila Rod MD  •  sodium chloride 0.9 % flush 3 mL, 3 mL, Intravenous, Q12H, Candida So MD  •  sodium chloride 0.9 % flush 3-10 mL, 3-10 mL, Intravenous, PRN, Candida So MD  Allergies:  No Known Allergies  Immunizations:    There is no immunization history on file for this patient.  Social History:   Social History     Tobacco Use   • Smoking status: Never Smoker   • Smokeless tobacco: Never Used   Substance Use Topics   • Alcohol use: No     Frequency: Never      Family History:  Family History   Problem Relation Age of Onset   • Breast cancer Neg Hx    • Malig Hyperthermia Neg Hx         Review of Systems  Constitutional:  Denies fever or chills   Eyes:  Denies change in visual acuity   HEENT:  Denies nasal congestion or sore throat   Respiratory:  Denies cough or shortness of breath   Cardiovascular:  Denies chest pain or edema   GI:  Denies abdominal pain, nausea, vomiting, bloody stools or diarrhea   :  Denies dysuria   Musculoskeletal:  Denies back pain or joint pain   Integument:  Denies rash   Neurologic:  Denies headache, focal weakness or sensory changes   Endocrine:  Denies polyuria or polydipsia   Lymphatic:  Denies swollen glands   Psychiatric:  Denies depression or anxiety       Objective:  tMax 24 hrs: Temp (24hrs), Av.7 °F (36.5 °C), Min:97.7 °F (36.5 °C), Max:97.7 °F (36.5 °C)    Vitals Ranges:   Temp:  [97.7 °F (36.5 °C)] 97.7 °F (36.5 °C)  Heart Rate:  [78-80] 78  Resp:  [16-18] 16  BP: (129)/(80)  "129/80    Exam:  Vital Signs: /80 (BP Location: Right arm, Patient Position: Lying)   Pulse 78   Temp 97.7 °F (36.5 °C) (Oral)   Resp 16   Ht 162.6 cm (64\")   Wt 94.6 kg (208 lb 8.9 oz)   SpO2 97%   BMI 35.80 kg/m²   Constitutional:  Well developed, well nourished, no acute distress, non-toxic appearance    GI:  Soft, nondistended, normal bowel sounds, nontender, no organomegaly, no mass, no rebound, no guarding   :  No costovertebral angle tenderness   Musculoskeletal:  No edema, no tenderness, no deformities. Back- no tenderness  Integument:  Well hydrated, no rash   Lymphatic:  No lymphadenopathy noted   Neurologic:  Alert & oriented x 3,  Pelvic:   POPQ  +1  +1  -3  6  2  10  +1  +1  Na     LPP 41 cm H2O  PFS 15.2 ml/s     Assessment:    Prolapse of vaginal vault after hysterectomy    Cystocele, midline    Cystocele, lateral    Vaginal enterocele    Rectocele    Loss of perineal body, female    Female stress incontinence    Intrinsic sphincter deficiency (ISD)    Chronic interstitial cystitis    Incomplete defecation    Incompetence or weakening of pubocervical tissue    Incompetence or weakening of rectovaginal tissue    High-tone pelvic floor dysfunction    Bladder pain    Disorder of urethra        Plan:  Laparoscopic uterosacral ligament colpopexy  Laparoscopic paravaginal repair  Laparoscopic abdominal enterocele repair  Pubovaginal sling  Revision/removal of pubovaginal sling  Laparoscopic bilateral salpingectomy  Posterior colporrhaphy  Anterior colporrhaphy  Extraperitoneal colpopexy sacrospinous ligament fixation  Cystourethroscopy  And any other indicated procedures       Jamila Rod MD  10/15/2019    "

## 2019-10-15 NOTE — ANESTHESIA POSTPROCEDURE EVALUATION
"Patient: Nicki Aguilar    Procedure Summary     Date:  10/15/19 Room / Location:  [facility] OR [address] [place] [address] MAIN OR    Anesthesia Start:  1048 Anesthesia Stop:  1626    Procedures:       LAPAROSCOPIC UTEROSACRAL LIGAMENT SUSPENSION/SACRAL COLPOPEXY, LAPAROSCOPIC PARAVAGINAL REPAIR, COMBINED ANTEROPOSTER COLPORRHAPHY WITH ENTEROCELE REPAIR, VAGINAL APPROACH LAPAROSCOPIC/ABDOMINAL ENTEROCELE REPAIR, LAPAROCOPIC BILATERAL SALPINGECTOMY(LEAVE OVARIES IF LOOK) POSTERIOR COLPORRHAPHY, ANTERIOR COLPORRHAPHY, EXTRAPERITONEAL COLPOPEXY SACROSPINOUS LIGAMENT FIXATION, PERINEOPLASTY/PERINEORRHAPHY, CYSTOURETHROSCOPY AND ANY OTHER INDICATED PROCEDURES, PUBO VAGINAL SLING (N/A Abdomen)      POSTERIOR COLPORRHAPHY, (N/A Vagina) Diagnosis:      Surgeon:  Jamila Rod MD Provider:  Isaac Campa MD    Anesthesia Type:  general ASA Status:  2          Anesthesia Type: general  Last vitals  BP   117/76 (10/15/19 1820)   Temp   37 °C (98.6 °F) (10/15/19 1820)   Pulse   85 (10/15/19 1820)   Resp   16 (10/15/19 1820)     SpO2   98 % (10/15/19 1820)     Post Anesthesia Care and Evaluation    Patient location during evaluation: PACU  Patient participation: complete - patient participated  Level of consciousness: awake and alert  Pain score: 0  Pain management: adequate  Airway patency: patent  Anesthetic complications: No anesthetic complications    Cardiovascular status: acceptable  Respiratory status: acceptable  Hydration status: acceptable    Comments: /76 (BP Location: Right arm, Patient Position: Lying)   Pulse 85   Temp 37 °C (98.6 °F) (Oral)   Resp 16   Ht 162.6 cm (64\")   Wt 94.6 kg (208 lb 8.9 oz)   SpO2 98%   BMI 35.80 kg/m²       "

## 2019-10-16 PROBLEM — N83.8 CYST OF FALLOPIAN TUBE: Status: ACTIVE | Noted: 2019-10-16

## 2019-10-16 LAB
HCT VFR BLD AUTO: 32 % (ref 34–46.6)
HGB BLD-MCNC: 10.5 G/DL (ref 12–15.9)

## 2019-10-16 PROCEDURE — 85018 HEMOGLOBIN: CPT | Performed by: OBSTETRICS & GYNECOLOGY

## 2019-10-16 PROCEDURE — 25010000002 MORPHINE PER 10 MG: Performed by: OBSTETRICS & GYNECOLOGY

## 2019-10-16 PROCEDURE — 63710000001 PROMETHAZINE PER 12.5 MG: Performed by: OBSTETRICS & GYNECOLOGY

## 2019-10-16 PROCEDURE — 25010000002 ENOXAPARIN PER 10 MG: Performed by: OBSTETRICS & GYNECOLOGY

## 2019-10-16 PROCEDURE — 25010000003 CEFAZOLIN IN DEXTROSE 2-4 GM/100ML-% SOLUTION: Performed by: OBSTETRICS & GYNECOLOGY

## 2019-10-16 PROCEDURE — 85014 HEMATOCRIT: CPT | Performed by: OBSTETRICS & GYNECOLOGY

## 2019-10-16 RX ORDER — ATROPA BELLADONNA AND OPIUM 16.2; 6 MG/1; MG/1
60 SUPPOSITORY RECTAL ONCE
Status: COMPLETED | OUTPATIENT
Start: 2019-10-16 | End: 2019-10-16

## 2019-10-16 RX ORDER — METHENAMINE, SODIUM PHOSPHATE, MONOBASIC, MONOHYDRATE, PHENYL SALICYLATE, METHYLENE BLUE, AND HYOSCYAMINE SULFATE 120; 40.8; 36; 10; .12 MG/1; MG/1; MG/1; MG/1; MG/1
1 CAPSULE ORAL 4 TIMES DAILY
Status: DISCONTINUED | OUTPATIENT
Start: 2019-10-16 | End: 2019-10-17 | Stop reason: HOSPADM

## 2019-10-16 RX ORDER — PROMETHAZINE HYDROCHLORIDE 12.5 MG/1
12.5 TABLET ORAL EVERY 4 HOURS PRN
Status: DISCONTINUED | OUTPATIENT
Start: 2019-10-16 | End: 2019-10-17 | Stop reason: HOSPADM

## 2019-10-16 RX ADMIN — IBUPROFEN 400 MG: 400 TABLET, FILM COATED ORAL at 09:14

## 2019-10-16 RX ADMIN — METHENAMINE, SODIUM PHOSPHATE, MONOBASIC, MONOHYDRATE, PHENYL SALICYLATE, METHYLENE BLUE, AND HYOSCYAMINE SULFATE 118 MG: 120; 40.8; 36; 10; .12 CAPSULE ORAL at 20:13

## 2019-10-16 RX ADMIN — METHENAMINE, SODIUM PHOSPHATE, MONOBASIC, MONOHYDRATE, PHENYL SALICYLATE, METHYLENE BLUE, AND HYOSCYAMINE SULFATE 118 MG: 120; 40.8; 36; 10; .12 CAPSULE ORAL at 10:26

## 2019-10-16 RX ADMIN — OXYCODONE HYDROCHLORIDE AND ACETAMINOPHEN 1 TABLET: 10; 325 TABLET ORAL at 10:26

## 2019-10-16 RX ADMIN — MORPHINE SULFATE 2 MG: 2 INJECTION, SOLUTION INTRAMUSCULAR; INTRAVENOUS at 03:47

## 2019-10-16 RX ADMIN — IBUPROFEN 400 MG: 400 TABLET, FILM COATED ORAL at 03:47

## 2019-10-16 RX ADMIN — OXYCODONE HYDROCHLORIDE AND ACETAMINOPHEN 1 TABLET: 10; 325 TABLET ORAL at 06:03

## 2019-10-16 RX ADMIN — DOCUSATE SODIUM 100 MG: 100 CAPSULE, LIQUID FILLED ORAL at 09:16

## 2019-10-16 RX ADMIN — IBUPROFEN 400 MG: 400 TABLET, FILM COATED ORAL at 00:30

## 2019-10-16 RX ADMIN — ATROPA BELLADONNA AND OPIUM 60 MG: 16.2; 6 SUPPOSITORY RECTAL at 18:04

## 2019-10-16 RX ADMIN — IBUPROFEN 400 MG: 400 TABLET, FILM COATED ORAL at 18:26

## 2019-10-16 RX ADMIN — OXYCODONE HYDROCHLORIDE AND ACETAMINOPHEN 1 TABLET: 10; 325 TABLET ORAL at 16:59

## 2019-10-16 RX ADMIN — PROMETHAZINE HYDROCHLORIDE 12.5 MG: 12.5 TABLET ORAL at 21:39

## 2019-10-16 RX ADMIN — OXYCODONE HYDROCHLORIDE AND ACETAMINOPHEN 1 TABLET: 10; 325 TABLET ORAL at 21:39

## 2019-10-16 RX ADMIN — DOCUSATE SODIUM 100 MG: 100 CAPSULE, LIQUID FILLED ORAL at 20:13

## 2019-10-16 RX ADMIN — IBUPROFEN 400 MG: 400 TABLET, FILM COATED ORAL at 13:36

## 2019-10-16 RX ADMIN — OXYCODONE HYDROCHLORIDE AND ACETAMINOPHEN 1 TABLET: 10; 325 TABLET ORAL at 01:53

## 2019-10-16 RX ADMIN — ENOXAPARIN SODIUM 40 MG: 40 INJECTION SUBCUTANEOUS at 09:16

## 2019-10-16 RX ADMIN — CEFAZOLIN SODIUM 2 G: 2 INJECTION, SOLUTION INTRAVENOUS at 06:04

## 2019-10-16 RX ADMIN — PROMETHAZINE HYDROCHLORIDE 12.5 MG: 12.5 TABLET ORAL at 18:26

## 2019-10-16 NOTE — PLAN OF CARE
Problem: Patient Care Overview  Goal: Plan of Care Review  Outcome: Ongoing (interventions implemented as appropriate)   10/16/19 9753   Coping/Psychosocial   Plan of Care Reviewed With patient   Plan of Care Review   Progress no change   OTHER   Outcome Summary PO and IV pain meds given. VSS. Tolerating reg diet. Holm and packing remove at 0600. IVF anf IV abx given. Will cont to monitor.      Goal: Individualization and Mutuality  Outcome: Ongoing (interventions implemented as appropriate)    Goal: Discharge Needs Assessment  Outcome: Ongoing (interventions implemented as appropriate)    Goal: Interprofessional Rounds/Family Conf  Outcome: Ongoing (interventions implemented as appropriate)      Problem: Pelvic Organ Prolapse, Surgical Repair (Adult)  Goal: Signs and Symptoms of Listed Potential Problems Will be Absent, Minimized or Managed (Pelvic Organ Prolapse, Surgical Repair)  Outcome: Ongoing (interventions implemented as appropriate)    Goal: Anesthesia/Sedation Recovery  Outcome: Ongoing (interventions implemented as appropriate)      Problem: Pain, Acute (Adult)  Goal: Identify Related Risk Factors and Signs and Symptoms  Outcome: Ongoing (interventions implemented as appropriate)    Goal: Acceptable Pain Control/Comfort Level  Outcome: Ongoing (interventions implemented as appropriate)

## 2019-10-16 NOTE — PROGRESS NOTES
DAILY PROGRESS NOTE    Patient Identification:  Name: Nicki Aguilar  Age: 49 y.o.  Sex: female  :  1970  MRN: [id number]              Subjective:  Interval History:   Ambulating  Tolerating diet  Has passed voiding trial   Pain control is not yet optimized     Objective:    Scheduled Meds:   Current Facility-Administered Medications:   •  cholecalciferol (VITAMIN D3) tablet 1,000 Units, 1,000 Units, Oral, Every Other Day, Jamila Rod MD  •  dextrose 5 % and sodium chloride 0.45 % infusion, 100 mL/hr, Intravenous, Continuous, Jamila Rod MD, Stopped at 10/16/19 0918  •  docusate sodium (COLACE) capsule 100 mg, 100 mg, Oral, BID, Jamila Rod MD, 100 mg at 10/16/19 0916  •  enoxaparin (LOVENOX) syringe 40 mg, 40 mg, Subcutaneous, Daily, Jamila Rod MD, 40 mg at 10/16/19 0916  •  ibuprofen (ADVIL,MOTRIN) tablet 400 mg, 400 mg, Oral, Q4H, Jamila Rod MD, 400 mg at 10/16/19 1336  •  lactated ringers infusion, 9 mL/hr, Intravenous, Continuous, Candida So MD, Stopped at 10/15/19 1836  •  morphine injection 1 mg, 1 mg, Intravenous, Q2H PRN, 1 mg at 10/15/19 2221 **AND** naloxone (NARCAN) injection 0.4 mg, 0.4 mg, Intravenous, Q5 Min PRN, Jamila Rod MD  •  morphine injection 2 mg, 2 mg, Intravenous, Q2H PRN, 2 mg at 10/16/19 0347 **AND** naloxone (NARCAN) injection 0.4 mg, 0.4 mg, Intravenous, Q5 Min PRN, Jamila Rod MD  •  ondansetron (ZOFRAN) tablet 4 mg, 4 mg, Oral, Q6H PRN **OR** ondansetron (ZOFRAN) injection 4 mg, 4 mg, Intravenous, Q6H PRN, Jamila Rod MD  •  oxyCODONE-acetaminophen (PERCOCET)  MG per tablet 1 tablet, 1 tablet, Oral, Q4H PRN, Jamila Rod MD, 1 tablet at 10/16/19 3481  •  oxyCODONE-acetaminophen (PERCOCET) 5-325 MG per tablet 1 tablet, 1 tablet, Oral, Q4H PRN, Jamila Rod MD  •  promethazine (PHENERGAN) injection 12.5 mg, 12.5 mg, Intravenous, Q6H PRN **OR** promethazine (PHENERGAN) injection 12.5 mg, 12.5 mg, Intramuscular, Q6H  PRN, Jamila Rod MD  •  promethazine (PHENERGAN) tablet 12.5 mg, 12.5 mg, Oral, Q4H PRN, Jamila Rod MD  •  uribel (URO-MP) capsule 118 mg, 1 capsule, Oral, 4x Daily, Jamila Rod MD  dextrose 5 % and sodium chloride 0.45 % 100 mL/hr Last Rate: Stopped (10/16/19 0918)   lactated ringers 9 mL/hr Last Rate: Stopped (10/15/19 1836)     Continuous Infusions:  dextrose 5 % and sodium chloride 0.45 % 100 mL/hr Last Rate: Stopped (10/16/19 0918)   lactated ringers 9 mL/hr Last Rate: Stopped (10/15/19 1836)     PRN Meds:Morphine **AND** naloxone  •  Morphine **AND** naloxone  •  ondansetron **OR** ondansetron  •  oxyCODONE-acetaminophen  •  oxyCODONE-acetaminophen  •  promethazine **OR** promethazine  •  promethazine    Vital signs in last 24 hours:  Temp:  [97.3 °F (36.3 °C)-98 °F (36.7 °C)] 97.4 °F (36.3 °C)  Heart Rate:  [] 102  Resp:  [16-20] 20  BP: ()/(55-87) 122/87       Intake/Output:  Passed voiding trial    Exam:  Abdomen soft incisions and bandages clean intact  Data Review:  Lab Results (last 24 hours)     Procedure Component Value Units Date/Time    Hemoglobin & Hematocrit, Blood [[id number]]  (Abnormal) Collected:  10/16/19 0432    Specimen:  Blood Updated:  10/16/19 0504     Hemoglobin 10.5 g/dL      Hematocrit 32.0 %     Tissue Pathology Exam [[id number]] Collected:  10/15/19 1223    Specimen:  Tissue from Fallopian Tube, Right; Tissue from Fallopian Tube, Left; Tissue from Vagina Updated:  10/15/19 2104          Assessment:    Prolapse of vaginal vault after hysterectomy    Cystocele, midline    Cystocele, lateral    Vaginal enterocele    Rectocele    Loss of perineal body, female    Female stress incontinence    Intrinsic sphincter deficiency (ISD)    Chronic interstitial cystitis    Incomplete defecation    Incompetence or weakening of pubocervical tissue    Incompetence or weakening of rectovaginal tissue    High-tone pelvic floor dysfunction    Bladder pain    Disorder of  urethra    Vaginal vault prolapse    POD #1  Pain control is not yet optimized  And patient on my arrival sitting on toilet complaining of   Lower pelvic discomfort  Cath showed less than 100 ml so no evidence of bladder overdistention  B and O suppository given and some relief of discomfort    Plan:  Doing well POD #1  Has passed voiding trial but has not yet met criteria for pain control  Will administer phenergan 12.5 po 30 minutes prior to Percocet 10 mg q 4 hours along with  Ibuprofen q 4 hours over night  Uribel increased to QID   Will admit to inpatient and monitor overnight

## 2019-10-17 VITALS
RESPIRATION RATE: 16 BRPM | TEMPERATURE: 99.5 F | WEIGHT: 208.56 LBS | OXYGEN SATURATION: 91 % | BODY MASS INDEX: 35.61 KG/M2 | SYSTOLIC BLOOD PRESSURE: 97 MMHG | DIASTOLIC BLOOD PRESSURE: 55 MMHG | HEIGHT: 64 IN | HEART RATE: 82 BPM

## 2019-10-17 LAB
CYTO UR: NORMAL
LAB AP CASE REPORT: NORMAL
PATH REPORT.FINAL DX SPEC: NORMAL
PATH REPORT.GROSS SPEC: NORMAL

## 2019-10-17 PROCEDURE — 25010000002 HEPARIN (PORCINE) PER 1000 UNITS: Performed by: OBSTETRICS & GYNECOLOGY

## 2019-10-17 PROCEDURE — 25010000002 TRIAMCINOLONE PER 10 MG: Performed by: OBSTETRICS & GYNECOLOGY

## 2019-10-17 PROCEDURE — 63710000001 PROMETHAZINE PER 12.5 MG: Performed by: OBSTETRICS & GYNECOLOGY

## 2019-10-17 PROCEDURE — 25010000002 ENOXAPARIN PER 10 MG: Performed by: OBSTETRICS & GYNECOLOGY

## 2019-10-17 RX ORDER — OXYBUTYNIN CHLORIDE 5 MG/1
5 TABLET ORAL ONCE
Status: COMPLETED | OUTPATIENT
Start: 2019-10-17 | End: 2019-10-17

## 2019-10-17 RX ORDER — OXYBUTYNIN CHLORIDE 5 MG/1
2.5 TABLET ORAL 3 TIMES DAILY
Status: DISCONTINUED | OUTPATIENT
Start: 2019-10-17 | End: 2019-10-17 | Stop reason: HOSPADM

## 2019-10-17 RX ADMIN — PROMETHAZINE HYDROCHLORIDE 12.5 MG: 12.5 TABLET ORAL at 06:32

## 2019-10-17 RX ADMIN — IBUPROFEN 400 MG: 400 TABLET, FILM COATED ORAL at 12:27

## 2019-10-17 RX ADMIN — OXYCODONE HYDROCHLORIDE AND ACETAMINOPHEN 1 TABLET: 10; 325 TABLET ORAL at 14:33

## 2019-10-17 RX ADMIN — VITAMIN D, TAB 1000IU (100/BT) 1000 UNITS: 25 TAB at 08:34

## 2019-10-17 RX ADMIN — PROMETHAZINE HYDROCHLORIDE 12.5 MG: 12.5 TABLET ORAL at 01:55

## 2019-10-17 RX ADMIN — OXYBUTYNIN CHLORIDE 5 MG: 5 TABLET ORAL at 14:33

## 2019-10-17 RX ADMIN — DOCUSATE SODIUM 100 MG: 100 CAPSULE, LIQUID FILLED ORAL at 08:34

## 2019-10-17 RX ADMIN — LIDOCAINE HYDROCHLORIDE: 20 JELLY TOPICAL at 14:33

## 2019-10-17 RX ADMIN — IBUPROFEN 400 MG: 400 TABLET, FILM COATED ORAL at 04:17

## 2019-10-17 RX ADMIN — OXYCODONE HYDROCHLORIDE AND ACETAMINOPHEN 1 TABLET: 10; 325 TABLET ORAL at 06:32

## 2019-10-17 RX ADMIN — IBUPROFEN 400 MG: 400 TABLET, FILM COATED ORAL at 00:15

## 2019-10-17 RX ADMIN — IBUPROFEN 400 MG: 400 TABLET, FILM COATED ORAL at 16:40

## 2019-10-17 RX ADMIN — OXYCODONE HYDROCHLORIDE AND ACETAMINOPHEN 1 TABLET: 10; 325 TABLET ORAL at 01:55

## 2019-10-17 RX ADMIN — METHENAMINE, SODIUM PHOSPHATE, MONOBASIC, MONOHYDRATE, PHENYL SALICYLATE, METHYLENE BLUE, AND HYOSCYAMINE SULFATE 118 MG: 120; 40.8; 36; 10; .12 CAPSULE ORAL at 08:34

## 2019-10-17 RX ADMIN — METHENAMINE, SODIUM PHOSPHATE, MONOBASIC, MONOHYDRATE, PHENYL SALICYLATE, METHYLENE BLUE, AND HYOSCYAMINE SULFATE 118 MG: 120; 40.8; 36; 10; .12 CAPSULE ORAL at 12:28

## 2019-10-17 RX ADMIN — IBUPROFEN 400 MG: 400 TABLET, FILM COATED ORAL at 08:34

## 2019-10-17 RX ADMIN — OXYCODONE HYDROCHLORIDE AND ACETAMINOPHEN 1 TABLET: 10; 325 TABLET ORAL at 10:52

## 2019-10-17 RX ADMIN — ENOXAPARIN SODIUM 40 MG: 40 INJECTION SUBCUTANEOUS at 08:34

## 2019-10-17 NOTE — PLAN OF CARE
Problem: Patient Care Overview  Goal: Plan of Care Review  Outcome: Ongoing (interventions implemented as appropriate)   10/17/19 0626   Coping/Psychosocial   Plan of Care Reviewed With patient   Plan of Care Review   Progress improving   OTHER   Outcome Summary vss, mpain well controlled by ibuprofen and percocet with phnergan po, stewart with good urine output, encourage to increase fluid intake and to ambulate more in the am, will d/c stewart now and to do voiding trial agian.     Goal: Individualization and Mutuality  Outcome: Ongoing (interventions implemented as appropriate)    Goal: Discharge Needs Assessment  Outcome: Ongoing (interventions implemented as appropriate)    Goal: Interprofessional Rounds/Family Conf  Outcome: Ongoing (interventions implemented as appropriate)      Problem: Pelvic Organ Prolapse, Surgical Repair (Adult)  Goal: Signs and Symptoms of Listed Potential Problems Will be Absent, Minimized or Managed (Pelvic Organ Prolapse, Surgical Repair)  Outcome: Ongoing (interventions implemented as appropriate)    Goal: Anesthesia/Sedation Recovery  Outcome: Ongoing (interventions implemented as appropriate)      Problem: Pain, Acute (Adult)  Goal: Identify Related Risk Factors and Signs and Symptoms  Outcome: Ongoing (interventions implemented as appropriate)    Goal: Acceptable Pain Control/Comfort Level  Outcome: Ongoing (interventions implemented as appropriate)

## 2019-10-17 NOTE — PAYOR COMM NOTE
"Nicki Aguilar (49 y.o. Female)     ATTN: NURSE REVIEW   REF#[id number]  PLEASE CALL BACK TO JALIL [phone] OR [phone]  THANKS!   JALIL      Date of Birth Social Security Number Address Home Phone MRN    1970  [address] [place] [address] [phone] [id number]    Mandaeism Marital Status          Roman Catholic        Admission Date Admission Type Admitting Provider Attending Provider Department, Room/Bed    10/15/19 Elective Jamila Rod MD Tate, Susan S B, MD [address], 85/1    Discharge Date Discharge Disposition Discharge Destination         Home or Self Care              Attending Provider:  Jamila Rod MD    Allergies:  No Known Allergies    Isolation:  None   Infection:  None   Code Status:  CPR    Ht:  162.6 cm (64\")   Wt:  94.6 kg (208 lb 8.9 oz)    Admission Cmt:  None   Principal Problem:  Prolapse of vaginal vault after hysterectomy [N99.3]                 Active Insurance as of 10/15/2019     Primary Coverage     Payor Plan Insurance Group Employer/Plan Group    [employer] BLUE [employer] PPO [id number]     Payor Plan Address Payor Plan Phone Number Payor Plan Fax Number Effective Dates    [address] [phone]  2017 - None Entered    [place] [address]       Subscriber Name Subscriber Birth Date Member ID       AUKA AGUILAR 1968 [id number]                 Emergency Contacts      (Rel.) Home Phone Work Phone Mobile Phone    Akua Aguilar (Spouse) [phone] -- --               History & Physical      Jamila Rod MD at 10/15/19 1000          Female Pelvic Medicine and Reconstructive Surgery   History & Physical    Patient Identification:  Name: Nicki Aguilar Age: 49 y.o. Sex: female :  1970 MRN: Female Pelvic Medicine and Reconstructive Surgery   History & Physical    Patient Identification:  Name: Nicki Aguilar Age: 49 y.o. Sex: female :  " 1970 MRN: [id number]                            Problem List:    Prolapse of vaginal vault after hysterectomy    Cystocele, midline    Cystocele, lateral    Vaginal enterocele    Rectocele    Loss of perineal body, female    Female stress incontinence    Intrinsic sphincter deficiency (ISD)    Chronic interstitial cystitis    Incomplete defecation    Incompetence or weakening of pubocervical tissue    Incompetence or weakening of rectovaginal tissue    High-tone pelvic floor dysfunction    Bladder pain    Disorder of urethra    Vaginal vault prolapse    Past Medical History:  Past Medical History:   Diagnosis Date   • Female bladder prolapse      Past Surgical History:  Past Surgical History:   Procedure Laterality Date   • BLADDER SUSPENSION     • HYSTERECTOMY     • INCONTINENCE SURGERY     • TUBAL ABDOMINAL LIGATION        Home Meds:  Medications Prior to Admission   Medication Sig Dispense Refill Last Dose   • Chlorhexidine Gluconate Cloth 2 % pads Apply  topically. As directed pre op   10/15/2019 at 0630   • cholecalciferol (VITAMIN D3) 1000 units tablet Take 1,000 Units by mouth Every Other Day.   10/12/2019   • lisdexamfetamine (VYVANSE) 30 MG capsule Take 30 mg by mouth Daily As Needed TO HOD 3 DAYS BEFORE SURGERY   10/12/2019   • uribel (URO-MP) 118 MG capsule capsule Take 1 capsule by mouth 2 (Two) Times a Day.   10/12/2019 at 1900     Current Meds:     Current Facility-Administered Medications:   •  ceFAZolin in dextrose (ANCEF) IVPB solution 2 g, 2 g, Intravenous, Once, Jamila Rod MD  •  lactated ringers infusion, 9 mL/hr, Intravenous, Continuous, Candida So MD, Last Rate: 9 mL/hr at 10/15/19 0847, 9 mL/hr at 10/15/19 0847  •  lidocaine PF 1% (XYLOCAINE) injection 0.5 mL, 0.5 mL, Injection, Once PRN, Candida So MD  •  midazolam (VERSED) injection 1 mg, 1 mg, Intravenous, Q5 Min PRN, 1 mg at 10/15/19 0847 **OR** midazolam (VERSED) injection 2 mg, 2 mg, Intravenous, Q5 Min PRN, Merker,  "MD Candida  •  sodium chloride 0.9 % flush 10 mL, 10 mL, Intravenous, PRN, Jamila Rod MD  •  sodium chloride 0.9 % flush 3 mL, 3 mL, Intravenous, Q12H, Jamila Rod MD  •  sodium chloride 0.9 % flush 3 mL, 3 mL, Intravenous, Q12H, Candida So MD  •  sodium chloride 0.9 % flush 3-10 mL, 3-10 mL, Intravenous, PRN, Candida So MD  Allergies:  No Known Allergies  Immunizations:    There is no immunization history on file for this patient.  Social History:   Social History     Tobacco Use   • Smoking status: Never Smoker   • Smokeless tobacco: Never Used   Substance Use Topics   • Alcohol use: No     Frequency: Never      Family History:  Family History   Problem Relation Age of Onset   • Breast cancer Neg Hx    • Malig Hyperthermia Neg Hx         Review of Systems  Constitutional:  Denies fever or chills   Eyes:  Denies change in visual acuity   HEENT:  Denies nasal congestion or sore throat   Respiratory:  Denies cough or shortness of breath   Cardiovascular:  Denies chest pain or edema   GI:  Denies abdominal pain, nausea, vomiting, bloody stools or diarrhea   :  Denies dysuria   Musculoskeletal:  Denies back pain or joint pain   Integument:  Denies rash   Neurologic:  Denies headache, focal weakness or sensory changes   Endocrine:  Denies polyuria or polydipsia   Lymphatic:  Denies swollen glands   Psychiatric:  Denies depression or anxiety       Objective:  tMax 24 hrs: Temp (24hrs), Av.7 °F (36.5 °C), Min:97.7 °F (36.5 °C), Max:97.7 °F (36.5 °C)    Vitals Ranges:   Temp:  [97.7 °F (36.5 °C)] 97.7 °F (36.5 °C)  Heart Rate:  [78-80] 78  Resp:  [16-18] 16  BP: (129)/(80) 129/80    Exam:  Vital Signs: /80 (BP Location: Right arm, Patient Position: Lying)   Pulse 78   Temp 97.7 °F (36.5 °C) (Oral)   Resp 16   Ht 162.6 cm (64\")   Wt 94.6 kg (208 lb 8.9 oz)   SpO2 97%   BMI 35.80 kg/m²    Constitutional:  Well developed, well nourished, no acute distress, non-toxic appearance    GI:  " Soft, nondistended, normal bowel sounds, nontender, no organomegaly, no mass, no rebound, no guarding   :  No costovertebral angle tenderness   Musculoskeletal:  No edema, no tenderness, no deformities. Back- no tenderness  Integument:  Well hydrated, no rash   Lymphatic:  No lymphadenopathy noted   Neurologic:  Alert & oriented x 3,  Pelvic:   POPQ  +1  +1  -3  6  2  10  +1  +1  Na     LPP 41 cm H2O  PFS 15.2 ml/s     Assessment:    Prolapse of vaginal vault after hysterectomy    Cystocele, midline    Cystocele, lateral    Vaginal enterocele    Rectocele    Loss of perineal body, female    Female stress incontinence    Intrinsic sphincter deficiency (ISD)    Chronic interstitial cystitis    Incomplete defecation    Incompetence or weakening of pubocervical tissue    Incompetence or weakening of rectovaginal tissue    High-tone pelvic floor dysfunction    Bladder pain    Disorder of urethra        Plan:  Laparoscopic uterosacral ligament colpopexy  Laparoscopic paravaginal repair  Laparoscopic abdominal enterocele repair  Pubovaginal sling  Revision/removal of pubovaginal sling  Laparoscopic bilateral salpingectomy  Posterior colporrhaphy  Anterior colporrhaphy  Extraperitoneal colpopexy sacrospinous ligament fixation  Cystourethroscopy  And any other indicated procedures       Jamila Rod MD  10/15/2019      Electronically signed by Jamila Rod MD at 10/15/19 1001     Jamila Rod MD at 10/08/19 1821          Female Pelvic Medicine and Reconstructive Surgery   History & Physical    Patient Identification:  Name: Nicki Aguilar Age: 49 y.o. Sex: female :  1970 MRN: Female Pelvic Medicine and Reconstructive Surgery   History & Physical    Patient Identification:  Name: Nicki Aguilar Age: 49 y.o. Sex: female :  1970 MRN: [id number]                            Problem List:    Prolapse of vaginal vault after hysterectomy    Cystocele, midline    Cystocele, lateral    Vaginal  enterocele    Rectocele    Loss of perineal body, female    Female stress incontinence    Intrinsic sphincter deficiency (ISD)    Chronic interstitial cystitis    Incomplete defecation    Incompetence or weakening of pubocervical tissue    Incompetence or weakening of rectovaginal tissue    High-tone pelvic floor dysfunction    Bladder pain    Disorder of urethra    Past Medical History:  No past medical history on file.  Past Surgical History:  No past surgical history on file.   Home Meds:  No medications prior to admission.     Current Meds:   No current facility-administered medications for this encounter.   No current outpatient medications on file.  Allergies:  Allergies not on file  Immunizations:    There is no immunization history on file for this patient.  Social History:   Social History     Tobacco Use   • Smoking status: Not on file   Substance Use Topics   • Alcohol use: Not on file      Family History:  Family History   Problem Relation Age of Onset   • Breast cancer Neg Hx         Review of Systems  Constitutional:  Denies fever or chills   Eyes:  Denies change in visual acuity   HEENT:  Denies nasal congestion or sore throat   Respiratory:  Denies cough or shortness of breath   Cardiovascular:  Denies chest pain or edema   GI:  Denies abdominal pain, nausea, vomiting, bloody stools or diarrhea   :  Denies dysuria   Musculoskeletal:  Denies back pain or joint pain   Integument:  Denies rash   Neurologic:  Denies headache, focal weakness or sensory changes   Endocrine:  Denies polyuria or polydipsia   Lymphatic:  Denies swollen glands   Psychiatric:  Denies depression or anxiety       Objective:  tMax 24 hrs: No data recorded.    Vitals Ranges:   BP: ()/()   Arterial Line BP: ()/()     Exam:  Vital Signs: There were no vitals taken for this visit.  Constitutional:  Well developed, well nourished, no acute distress, non-toxic appearance    GI:  Soft, nondistended, normal bowel sounds, nontender, no  organomegaly, no mass, no rebound, no guarding   :  No costovertebral angle tenderness   Musculoskeletal:  No edema, no tenderness, no deformities. Back- no tenderness  Integument:  Well hydrated, no rash   Lymphatic:  No lymphadenopathy noted   Neurologic:  Alert & oriented x 3,  Pelvic:     POPQ  +1  +1  -3  6  2  10  +1  +1  Na    LPP 41 cm H2O  PFS 15.2 ml/s    Assessment:    Prolapse of vaginal vault after hysterectomy    Cystocele, midline    Cystocele, lateral    Vaginal enterocele    Rectocele    Loss of perineal body, female    Female stress incontinence    Intrinsic sphincter deficiency (ISD)    Chronic interstitial cystitis    Incomplete defecation    Incompetence or weakening of pubocervical tissue    Incompetence or weakening of rectovaginal tissue    High-tone pelvic floor dysfunction    Bladder pain    Disorder of urethra      Plan:  Laparoscopic uterosacral ligament colpopexy  Laparoscopic paravaginal repair  Laparoscopic abdominal enterocele repair  Pubovaginal sling  Revision/removal of pubovaginal sling  Laparoscopic bilateral salpingectomy  Posterior colporrhaphy  Anterior colporrhaphy  Extraperitoneal colpopexy sacrospinous ligament fixation  Cystourethroscopy  And any other indicated procedures    Jamila Rod MD  10/8/2019      Electronically signed by Jamila Rod MD at 10/08/19 3107       Vital Signs (last 3 days)     Date/Time   Temp   Temp src   Pulse   Resp   BP   Patient Position   SpO2    10/17/19 1000   99.5 (37.5)   Oral   82   16   97/55   Lying   91    10/17/19 0551   98.1 (36.7)   Oral   86   18   97/56   Lying   91    10/17/19 0149   98.7 (37.1)   Oral   86   18   92/60   Lying   94    10/16/19 2113   98.1 (36.7)   Oral   91   18   96/56   Lying   94    10/16/19 1736   97.4 (36.3)   Oral   102   20   122/87   Lying   98    10/16/19 1300   97.3 (36.3)   Oral   82   16   103/58   Lying   97    10/16/19 0956   97.8 (36.6)   Oral   76   16   90/55   Lying   99    10/16/19  0552   97.3 (36.3)   Oral   87   16   103/66   Lying   97    10/16/19 0202   98 (36.7)   Oral   78   16   120/72   Lying   99    10/15/19 2300   98 (36.7)   Oral   90   16   114/69   Lying   98    10/15/19 1820   98.6 (37)   Oral   85   16   117/76   Lying   98    10/15/19 1745   98 (36.7)   Oral   84   18   122/78   --   98    10/15/19 1730   --   --   82   18   136/78   --   99    10/15/19 1715   --   --   67   18   125/75   --   99    10/15/19 1700   --   --   67   16   125/64   --   100    10/15/19 1645   --   --   78   16   126/74   --   100    10/15/19 1630   --   --   73   16   111/68   --   99    10/15/19 1624   98.6 (37)   Oral   76   16   114/64   Lying   98    10/15/19 0850   --   --   78   16   --   --   97    10/15/19 0820   97.7 (36.5)   Oral   80   18   129/80   Lying   97              Intake & Output (last 3 days)       10/14 0701 - 10/15 0700 10/15 0701 - 10/16 0700 10/16 0701 - 10/17 0700 10/17 0701 - 10/18 0700    P.O.   1110 240    I.V. (mL/kg)  2000 (21.1)      Other  850      Total Intake(mL/kg)  2850 (30.1) 1110 (11.7) 240 (2.5)    Urine (mL/kg/hr)  2300 4600 (2) 1275 (1.6)    Blood  350      Total Output  2650 4600 1275    Net  +200 -3490 -1035                 Hospital Medications (active)       Dose Frequency Start End    cholecalciferol (VITAMIN D3) tablet 1,000 Units 1,000 Units Every Other Day 10/15/2019     Sig - Route: Take 1 tablet by mouth Every Other Day. - Oral    dextrose 5 % and sodium chloride 0.45 % infusion 100 mL/hr Continuous 10/15/2019     Sig - Route: Infuse 100 mL/hr into a venous catheter Continuous. - Intravenous    docusate sodium (COLACE) capsule 100 mg 100 mg 2 Times Daily 10/15/2019     Sig - Route: Take 1 capsule by mouth 2 (Two) Times a Day. - Oral    enoxaparin (LOVENOX) syringe 40 mg 40 mg Daily 10/16/2019     Sig - Route: Inject 0.4 mL under the skin into the appropriate area as directed Daily. - Subcutaneous    heparin (porcine) 5000 UNIT/ML 10,000 Units,  "lidocaine (XYLOCAINE) 2% 10 mL, triamcinolone acetonide (KENALOG-40) 20 mg, bupivacaine (MARCAINE) 0.5 % 5 mL, lidocaine (UROJET) 10 mL in Sodium Bicarbonate 8.4 % 5 mL OR irrigation  Once 10/17/2019 10/17/2019    Sig - Route: Irrigate with  to the affected area as directed by provider 1 (One) Time. - Irrigation    ibuprofen (ADVIL,MOTRIN) tablet 400 mg 400 mg Every 4 Hours Scheduled 10/15/2019     Sig - Route: Take 1 tablet by mouth Every 4 (Four) Hours. - Oral    lactated ringers infusion 9 mL/hr Continuous 10/15/2019     Sig - Route: Infuse 9 mL/hr into a venous catheter Continuous. - Intravenous    morphine injection 1 mg 1 mg Every 2 Hours PRN 10/15/2019 10/25/2019    Sig - Route: Infuse 0.5 mL into a venous catheter Every 2 (Two) Hours As Needed for Moderate Pain . - Intravenous    Linked Group 1:  \"And\" Linked Group Details        morphine injection 2 mg 2 mg Every 2 Hours PRN 10/15/2019 10/25/2019    Sig - Route: Infuse 1 mL into a venous catheter Every 2 (Two) Hours As Needed for Severe Pain . - Intravenous    Linked Group 2:  \"And\" Linked Group Details        naloxone (NARCAN) injection 0.4 mg 0.4 mg Every 5 Minutes PRN 10/15/2019     Sig - Route: Infuse 1 mL into a venous catheter Every 5 (Five) Minutes As Needed for Respiratory Depression. - Intravenous    Linked Group 1:  \"And\" Linked Group Details        naloxone (NARCAN) injection 0.4 mg 0.4 mg Every 5 Minutes PRN 10/15/2019     Sig - Route: Infuse 1 mL into a venous catheter Every 5 (Five) Minutes As Needed for Respiratory Depression. - Intravenous    Linked Group 2:  \"And\" Linked Group Details        ondansetron (ZOFRAN) injection 4 mg 4 mg Every 6 Hours PRN 10/15/2019     Sig - Route: Infuse 2 mL into a venous catheter Every 6 (Six) Hours As Needed for Nausea or Vomiting. - Intravenous    Linked Group 3:  \"Or\" Linked Group Details        ondansetron (ZOFRAN) tablet 4 mg 4 mg Every 6 Hours PRN 10/15/2019     Sig - Route: Take 1 tablet by mouth Every " "6 (Six) Hours As Needed for Nausea or Vomiting. - Oral    Linked Group 3:  \"Or\" Linked Group Details        opium-belladonna (B&O SUPPRETTES) suppository 60 mg 60 mg Once 10/16/2019 10/16/2019    Sig - Route: Insert 1 suppository into the rectum 1 (One) Time. - Rectal    oxybutynin (DITROPAN) tablet 2.5 mg 2.5 mg 3 Times Daily 10/17/2019     Sig - Route: Take 0.5 tablets by mouth 3 (Three) Times a Day. - Oral    oxybutynin (DITROPAN) tablet 5 mg 5 mg Once 10/17/2019 10/17/2019    Sig - Route: Take 1 tablet by mouth 1 (One) Time. - Oral    oxyCODONE-acetaminophen (PERCOCET)  MG per tablet 1 tablet 1 tablet Every 4 Hours PRN 10/15/2019 10/25/2019    Sig - Route: Take 1 tablet by mouth Every 4 (Four) Hours As Needed for Severe Pain . - Oral    oxyCODONE-acetaminophen (PERCOCET) 5-325 MG per tablet 1 tablet 1 tablet Every 4 Hours PRN 10/15/2019 10/25/2019    Sig - Route: Take 1 tablet by mouth Every 4 (Four) Hours As Needed for Moderate Pain . - Oral    promethazine (PHENERGAN) injection 12.5 mg 12.5 mg Every 6 Hours PRN 10/15/2019     Sig - Route: Infuse 0.5 mL into a venous catheter Every 6 (Six) Hours As Needed for Nausea or Vomiting. - Intravenous    Linked Group 4:  \"Or\" Linked Group Details        promethazine (PHENERGAN) injection 12.5 mg 12.5 mg Every 6 Hours PRN 10/15/2019     Sig - Route: Inject 0.5 mL into the appropriate muscle as directed by prescriber Every 6 (Six) Hours As Needed for Nausea or Vomiting. - Intramuscular    Linked Group 4:  \"Or\" Linked Group Details        promethazine (PHENERGAN) tablet 12.5 mg 12.5 mg Every 4 Hours PRN 10/16/2019     Sig - Route: Take 1 tablet by mouth Every 4 (Four) Hours As Needed for Nausea or Vomiting. - Oral    uribel (URO-MP) capsule 118 mg 1 capsule 4 Times Daily 10/16/2019     Sig - Route: Take 1 capsule by mouth 4 (Four) Times a Day. - Oral    lidocaine-bupivacaine-heparin-sodium bicarbonate bladder instillation (Discontinued) 34 mL Once 10/17/2019 " 10/17/2019    Sig - Route: Instill 34 mL into the bladder 1 (One) Time. - Intravesical    Reason for Discontinue: Reorder    uribel (URO-MP) capsule 118 mg (Discontinued) 1 capsule 2 Times Daily - RT 10/15/2019 10/16/2019    Sig - Route: Take 1 capsule by mouth 2 (Two) Times a Day. - Oral          Lab Results (last 72 hours)     Procedure Component Value Units Date/Time    Tissue Pathology Exam [[id number]] Collected:  10/15/19 1223    Specimen:  Tissue from Fallopian Tube, Right; Tissue from Fallopian Tube, Left; Tissue from Vagina Updated:  10/17/19 1248     Case Report --     Surgical Pathology Report                         Case: [id number]                                  Authorizing Provider:  Jamila Rod MD        Collected:           10/15/2019 12:23 PM          Ordering Location:     [facility]  Received:            10/15/2019 09:04 PM                                 MAIN OR                                                                      Pathologist:           Jovita Alonzo MD                                                          Specimens:   1) - Fallopian Tube, Right, RIGHT PARA-TUBAL CYST OF FALLOPIAN TUBE                                 2) - Fallopian Tube, Left, LEFT PARA-TUBAL CYST OF FALLOPIAN TUBE                                   3) - Vagina, PORTION OF VAGINAL SLING                                                       Final Diagnosis --     1.  Fallopian Tube, Right, Segmental Resection:    A.  Benign fimbriated portion of fallopian tube with complete cross sections identified.    B.  Paratubal cyst formation.     2.  Fallopian Tube, Left, Segmental Resection:    A. Benign fimbriated and tubular portions of fallopian tube with complete cross sections identified.   B.  Paratubal cyst formation.      3.  Vaginal Sling, Removal (gross diagnosis only):   A.  Grossly identified synthetic mesh material embedded within dense fibrous tissue.    mec/brb       Gross  "Description --     1. The specimen is received in formalin labeled with the patient's name and further designated \"right paratubal cyst of fallopian tube\".  The specimen consists of a purple-gray segment of fallopian tube and fimbriated end.  The specimen consists predominately of fimbriae.  The tissue measures 3.0 x 2.3 x 1.2 cm.  The entire specimen is sectioned and submitted en toto in cassette 1.    2. The specimen is received in formalin labeled with the patient's name and further designated \"left paratubal cyst of fallopian tube\".  The specimen consists of two purple-tan segments of fallopian tube and fimbriated end.  The tissue measure 3.2 x 1.2 x 1.0 and 3.5 x 1.0 x 0.8 cm.  The second excision consists predominately of fimbriae.  Sectioning through the first tubal segment reveals a narrow pinpoint lumen.  The entire tubal segment is submitted en toto in cassette 2A.  The second segment of predominately fimbriae is sectioned and submitted in cassette 2B.    3. The specimen is received in formalin labeled with the patient's name and further designated \"portion of vaginal sling\".  The specimen consists of two fragments of pink-tan soft tissue densely embedded with wirey synthetic mesh.  The tissue pieces measure 1.3 x 0.6 x 0.5 and 1.4 x 0.4 x 0.3 cm.  No soft tissue will be submitted for routine histology due to the densely embedded mesh.  Gross diagnosis only.    hbt/uso/jat/kds       Microscopic Description --     Microscopic performed, incorporated in diagnosis.       Hemoglobin & Hematocrit, Blood [[id number]]  (Abnormal) Collected:  10/16/19 0432    Specimen:  Blood Updated:  10/16/19 0504     Hemoglobin 10.5 g/dL      Hematocrit 32.0 %           Operative/Procedure Notes (all)     No notes of this type exist for this encounter.           Physician Progress Notes (last 72 hours) (Notes from 10/14/19 1532 through 10/17/19 1532)      Jamila Rod MD at 10/17/19 1517          DAILY PROGRESS " NOTE    Patient Identification:  Name: Nicki Aguilar  Age: 49 y.o.  Sex: female  :  1970  MRN: [id number]              Subjective:  Interval History:   Ambulating  Voiding   Tolerating diet  Pain controlled but still has some bladder spasm    Objective:    Scheduled Meds:   Current Facility-Administered Medications:   •  cholecalciferol (VITAMIN D3) tablet 1,000 Units, 1,000 Units, Oral, Every Other Day, Jamila Rod MD, 1,000 Units at 10/17/19 0834  •  dextrose 5 % and sodium chloride 0.45 % infusion, 100 mL/hr, Intravenous, Continuous, Jamila Rod MD, Stopped at 10/16/19 0918  •  docusate sodium (COLACE) capsule 100 mg, 100 mg, Oral, BID, Jamila Rod MD, 100 mg at 10/17/19 0834  •  enoxaparin (LOVENOX) syringe 40 mg, 40 mg, Subcutaneous, Daily, Jamila Rod MD, 40 mg at 10/17/19 0834  •  ibuprofen (ADVIL,MOTRIN) tablet 400 mg, 400 mg, Oral, Q4H, Jamila Rod MD, 400 mg at 10/17/19 1227  •  lactated ringers infusion, 9 mL/hr, Intravenous, Continuous, Candida So MD, Stopped at 10/15/19 1836  •  morphine injection 1 mg, 1 mg, Intravenous, Q2H PRN, 1 mg at 10/15/19 2221 **AND** naloxone (NARCAN) injection 0.4 mg, 0.4 mg, Intravenous, Q5 Min PRN, Jaimla Rod MD  •  morphine injection 2 mg, 2 mg, Intravenous, Q2H PRN, 2 mg at 10/16/19 0347 **AND** naloxone (NARCAN) injection 0.4 mg, 0.4 mg, Intravenous, Q5 Min PRN, Jamila Rod MD  •  ondansetron (ZOFRAN) tablet 4 mg, 4 mg, Oral, Q6H PRN **OR** ondansetron (ZOFRAN) injection 4 mg, 4 mg, Intravenous, Q6H PRN, Jamila Rod MD  •  oxybutynin (DITROPAN) tablet 2.5 mg, 2.5 mg, Oral, TID, Jamila Rod MD  •  oxyCODONE-acetaminophen (PERCOCET)  MG per tablet 1 tablet, 1 tablet, Oral, Q4H PRN, Jamila Rod MD, 1 tablet at 10/17/19 1433  •  oxyCODONE-acetaminophen (PERCOCET) 5-325 MG per tablet 1 tablet, 1 tablet, Oral, Q4H PRN, Jamila Rod MD  •  promethazine (PHENERGAN) injection 12.5 mg, 12.5 mg,  Intravenous, Q6H PRN **OR** promethazine (PHENERGAN) injection 12.5 mg, 12.5 mg, Intramuscular, Q6H PRN, Jamila Rod MD  •  promethazine (PHENERGAN) tablet 12.5 mg, 12.5 mg, Oral, Q4H PRN, Jamila Rod MD, 12.5 mg at 10/17/19 0632  •  uribel (URO-MP) capsule 118 mg, 1 capsule, Oral, 4x Daily, Jamila Rod MD, 118 mg at 10/17/19 1228  dextrose 5 % and sodium chloride 0.45 % 100 mL/hr Last Rate: Stopped (10/16/19 0918)   lactated ringers 9 mL/hr Last Rate: Stopped (10/15/19 1836)     Continuous Infusions:  dextrose 5 % and sodium chloride 0.45 % 100 mL/hr Last Rate: Stopped (10/16/19 0918)   lactated ringers 9 mL/hr Last Rate: Stopped (10/15/19 1836)     PRN Meds:Morphine **AND** naloxone  •  Morphine **AND** naloxone  •  ondansetron **OR** ondansetron  •  oxyCODONE-acetaminophen  •  oxyCODONE-acetaminophen  •  promethazine **OR** promethazine  •  promethazine    Vital signs in last 24 hours:  Temp:  [97.4 °F (36.3 °C)-99.5 °F (37.5 °C)] 99.5 °F (37.5 °C)  Heart Rate:  [] 82  Resp:  [16-20] 16  BP: ()/(55-87) 97/55       Intake/Output:    Passed voiding trial    Exam:  Abdomen soft   Appropriately tender  Incisions clean dry  Bandages intact  Urethra somewhat edematous but easy insertion of catheter for instillation.    Data Review:  Lab Results (last 24 hours)     Procedure Component Value Units Date/Time    Tissue Pathology Exam [[id number]] Collected:  10/15/19 1223    Specimen:  Tissue from Fallopian Tube, Right; Tissue from Fallopian Tube, Left; Tissue from Vagina Updated:  10/17/19 8024     Case Report --     Surgical Pathology Report                         Case: [id number]                                  Authorizing Provider:  Jamila Rod MD        Collected:           10/15/2019 12:23 PM          Ordering Location:     [facility]  Received:            10/15/2019 09:04 PM                                 MAIN OR                                                          "             Pathologist:           Jovita Alonzo MD                                                          Specimens:   1) - Fallopian Tube, Right, RIGHT PARA-TUBAL CYST OF FALLOPIAN TUBE                                 2) - Fallopian Tube, Left, LEFT PARA-TUBAL CYST OF FALLOPIAN TUBE                                   3) - Vagina, PORTION OF VAGINAL SLING                                                       Final Diagnosis --     1.  Fallopian Tube, Right, Segmental Resection:    A.  Benign fimbriated portion of fallopian tube with complete cross sections identified.    B.  Paratubal cyst formation.     2.  Fallopian Tube, Left, Segmental Resection:    A. Benign fimbriated and tubular portions of fallopian tube with complete cross sections identified.   B.  Paratubal cyst formation.      3.  Vaginal Sling, Removal (gross diagnosis only):   A.  Grossly identified synthetic mesh material embedded within dense fibrous tissue.    mec/brb       Gross Description --     1. The specimen is received in formalin labeled with the patient's name and further designated \"right paratubal cyst of fallopian tube\".  The specimen consists of a purple-gray segment of fallopian tube and fimbriated end.  The specimen consists predominately of fimbriae.  The tissue measures 3.0 x 2.3 x 1.2 cm.  The entire specimen is sectioned and submitted en toto in cassette 1.    2. The specimen is received in formalin labeled with the patient's name and further designated \"left paratubal cyst of fallopian tube\".  The specimen consists of two purple-tan segments of fallopian tube and fimbriated end.  The tissue measure 3.2 x 1.2 x 1.0 and 3.5 x 1.0 x 0.8 cm.  The second excision consists predominately of fimbriae.  Sectioning through the first tubal segment reveals a narrow pinpoint lumen.  The entire tubal segment is submitted en toto in cassette 2A.  The second segment of predominately fimbriae is sectioned and submitted in cassette 2B.    3. " "The specimen is received in formalin labeled with the patient's name and further designated \"portion of vaginal sling\".  The specimen consists of two fragments of pink-tan soft tissue densely embedded with wirey synthetic mesh.  The tissue pieces measure 1.3 x 0.6 x 0.5 and 1.4 x 0.4 x 0.3 cm.  No soft tissue will be submitted for routine histology due to the densely embedded mesh.  Gross diagnosis only.    hbt/uso/jat/kds       Microscopic Description --     Microscopic performed, incorporated in diagnosis.             Assessment:    Prolapse of vaginal vault after hysterectomy    Cystocele, midline    Cystocele, lateral    Vaginal enterocele    Rectocele    Loss of perineal body, female    Female stress incontinence    Intrinsic sphincter deficiency (ISD)    Chronic interstitial cystitis    Incomplete defecation    Incompetence or weakening of pubocervical tissue    Incompetence or weakening of rectovaginal tissue    High-tone pelvic floor dysfunction    Bladder pain    Disorder of urethra    Vaginal vault prolapse    Cyst of fallopian tube    POD #2  Doing well except issues of painful bladder syndrome  Good response to bladder instillation    Plan:  Has met criteria for discharge  Will send in oxybutinin 2.5 mg TID prn for home use  Follow up arrangements already made for next week    Electronically signed by Jamila Rod MD at 10/17/19 1521     Jamila Rod MD at 10/16/19 1823          DAILY PROGRESS NOTE    Patient Identification:  Name: Nicki Aguilar  Age: 49 y.o.  Sex: female  :  1970  MRN: [id number]              Subjective:  Interval History:   Ambulating  Tolerating diet  Has passed voiding trial   Pain control is not yet optimized     Objective:    Scheduled Meds:   Current Facility-Administered Medications:   •  cholecalciferol (VITAMIN D3) tablet 1,000 Units, 1,000 Units, Oral, Every Other Day, Jamila Rod MD  •  dextrose 5 % and sodium chloride 0.45 % infusion, 100 mL/hr, " Intravenous, Continuous, Jamila Rod MD, Stopped at 10/16/19 0918  •  docusate sodium (COLACE) capsule 100 mg, 100 mg, Oral, BID, Jamila Rod MD, 100 mg at 10/16/19 0916  •  enoxaparin (LOVENOX) syringe 40 mg, 40 mg, Subcutaneous, Daily, Jamila Rod MD, 40 mg at 10/16/19 0916  •  ibuprofen (ADVIL,MOTRIN) tablet 400 mg, 400 mg, Oral, Q4H, Jamila Rod MD, 400 mg at 10/16/19 1336  •  lactated ringers infusion, 9 mL/hr, Intravenous, Continuous, Candida So MD, Stopped at 10/15/19 1836  •  morphine injection 1 mg, 1 mg, Intravenous, Q2H PRN, 1 mg at 10/15/19 2221 **AND** naloxone (NARCAN) injection 0.4 mg, 0.4 mg, Intravenous, Q5 Min PRN, Jamila Rod MD  •  morphine injection 2 mg, 2 mg, Intravenous, Q2H PRN, 2 mg at 10/16/19 0347 **AND** naloxone (NARCAN) injection 0.4 mg, 0.4 mg, Intravenous, Q5 Min PRN, Jamila Rod MD  •  ondansetron (ZOFRAN) tablet 4 mg, 4 mg, Oral, Q6H PRN **OR** ondansetron (ZOFRAN) injection 4 mg, 4 mg, Intravenous, Q6H PRN, Jamila Rod MD  •  oxyCODONE-acetaminophen (PERCOCET)  MG per tablet 1 tablet, 1 tablet, Oral, Q4H PRN, Jamila Rod MD, 1 tablet at 10/16/19 1659  •  oxyCODONE-acetaminophen (PERCOCET) 5-325 MG per tablet 1 tablet, 1 tablet, Oral, Q4H PRN, Jamila Rod MD  •  promethazine (PHENERGAN) injection 12.5 mg, 12.5 mg, Intravenous, Q6H PRN **OR** promethazine (PHENERGAN) injection 12.5 mg, 12.5 mg, Intramuscular, Q6H PRN, Jamila Rod MD  •  promethazine (PHENERGAN) tablet 12.5 mg, 12.5 mg, Oral, Q4H PRN, Jamila Rod MD  •  uribel (URO-MP) capsule 118 mg, 1 capsule, Oral, 4x Daily, Jamila Rod MD  dextrose 5 % and sodium chloride 0.45 % 100 mL/hr Last Rate: Stopped (10/16/19 0918)   lactated ringers 9 mL/hr Last Rate: Stopped (10/15/19 1836)     Continuous Infusions:  dextrose 5 % and sodium chloride 0.45 % 100 mL/hr Last Rate: Stopped (10/16/19 0918)   lactated ringers 9 mL/hr Last Rate: Stopped (10/15/19 1836)      PRN Meds:Morphine **AND** naloxone  •  Morphine **AND** naloxone  •  ondansetron **OR** ondansetron  •  oxyCODONE-acetaminophen  •  oxyCODONE-acetaminophen  •  promethazine **OR** promethazine  •  promethazine    Vital signs in last 24 hours:  Temp:  [97.3 °F (36.3 °C)-98 °F (36.7 °C)] 97.4 °F (36.3 °C)  Heart Rate:  [] 102  Resp:  [16-20] 20  BP: ()/(55-87) 122/87       Intake/Output:  Passed voiding trial    Exam:  Abdomen soft incisions and bandages clean intact  Data Review:  Lab Results (last 24 hours)     Procedure Component Value Units Date/Time    Hemoglobin & Hematocrit, Blood [[id number]]  (Abnormal) Collected:  10/16/19 0432    Specimen:  Blood Updated:  10/16/19 0504     Hemoglobin 10.5 g/dL      Hematocrit 32.0 %     Tissue Pathology Exam [[id number]] Collected:  10/15/19 1223    Specimen:  Tissue from Fallopian Tube, Right; Tissue from Fallopian Tube, Left; Tissue from Vagina Updated:  10/15/19 2104          Assessment:    Prolapse of vaginal vault after hysterectomy    Cystocele, midline    Cystocele, lateral    Vaginal enterocele    Rectocele    Loss of perineal body, female    Female stress incontinence    Intrinsic sphincter deficiency (ISD)    Chronic interstitial cystitis    Incomplete defecation    Incompetence or weakening of pubocervical tissue    Incompetence or weakening of rectovaginal tissue    High-tone pelvic floor dysfunction    Bladder pain    Disorder of urethra    Vaginal vault prolapse    POD #1  Pain control is not yet optimized  And patient on my arrival sitting on toilet complaining of   Lower pelvic discomfort  Cath showed less than 100 ml so no evidence of bladder overdistention  B and O suppository given and some relief of discomfort    Plan:  Doing well POD #1  Has passed voiding trial but has not yet met criteria for pain control  Will administer phenergan 12.5 po 30 minutes prior to Percocet 10 mg q 4 hours along with  Ibuprofen q 4 hours over night  Uribel  increased to QID   Will admit to inpatient and monitor overnight      Electronically signed by Jamila Rod MD at 10/16/19 1829     Jamila Rod MD at 10/15/19 1024          Surgeon in room awaiting set up     Electronically signed by Jamila Rod MD at 10/15/19 1024       Consult Notes (last 72 hours) (Notes from 10/14/19 1532 through 10/17/19 1532)     No notes of this type exist for this encounter.

## 2019-10-17 NOTE — PROGRESS NOTES
DAILY PROGRESS NOTE    Patient Identification:  Name: Nicki Aguilar  Age: 49 y.o.  Sex: female  :  1970  MRN: [id number]              Subjective:  Interval History:   Ambulating  Voiding   Tolerating diet  Pain controlled but still has some bladder spasm    Objective:    Scheduled Meds:   Current Facility-Administered Medications:   •  cholecalciferol (VITAMIN D3) tablet 1,000 Units, 1,000 Units, Oral, Every Other Day, Jamila Rod MD, 1,000 Units at 10/17/19 0834  •  dextrose 5 % and sodium chloride 0.45 % infusion, 100 mL/hr, Intravenous, Continuous, Jamila Rod MD, Stopped at 10/16/19 0918  •  docusate sodium (COLACE) capsule 100 mg, 100 mg, Oral, BID, Jamila Rod MD, 100 mg at 10/17/19 0834  •  enoxaparin (LOVENOX) syringe 40 mg, 40 mg, Subcutaneous, Daily, Jamila Rod MD, 40 mg at 10/17/19 0834  •  ibuprofen (ADVIL,MOTRIN) tablet 400 mg, 400 mg, Oral, Q4H, Jamila Rod MD, 400 mg at 10/17/19 1227  •  lactated ringers infusion, 9 mL/hr, Intravenous, Continuous, Candida So MD, Stopped at 10/15/19 1836  •  morphine injection 1 mg, 1 mg, Intravenous, Q2H PRN, 1 mg at 10/15/19 2221 **AND** naloxone (NARCAN) injection 0.4 mg, 0.4 mg, Intravenous, Q5 Min PRN, Jamila Rod MD  •  morphine injection 2 mg, 2 mg, Intravenous, Q2H PRN, 2 mg at 10/16/19 0347 **AND** naloxone (NARCAN) injection 0.4 mg, 0.4 mg, Intravenous, Q5 Min PRN, Jamila Rod MD  •  ondansetron (ZOFRAN) tablet 4 mg, 4 mg, Oral, Q6H PRN **OR** ondansetron (ZOFRAN) injection 4 mg, 4 mg, Intravenous, Q6H PRN, Jamila Rod MD  •  oxybutynin (DITROPAN) tablet 2.5 mg, 2.5 mg, Oral, TID, Jamila Rod MD  •  oxyCODONE-acetaminophen (PERCOCET)  MG per tablet 1 tablet, 1 tablet, Oral, Q4H PRN, Jamila Rod MD, 1 tablet at 10/17/19 1433  •  oxyCODONE-acetaminophen (PERCOCET) 5-325 MG per tablet 1 tablet, 1 tablet, Oral, Q4H PRN, Jamila Rod MD  •  promethazine (PHENERGAN) injection 12.5 mg,  12.5 mg, Intravenous, Q6H PRN **OR** promethazine (PHENERGAN) injection 12.5 mg, 12.5 mg, Intramuscular, Q6H PRN, Jamila Rod MD  •  promethazine (PHENERGAN) tablet 12.5 mg, 12.5 mg, Oral, Q4H PRN, Jamila Rod MD, 12.5 mg at 10/17/19 0632  •  uribel (URO-MP) capsule 118 mg, 1 capsule, Oral, 4x Daily, Jamila Rod MD, 118 mg at 10/17/19 1228  dextrose 5 % and sodium chloride 0.45 % 100 mL/hr Last Rate: Stopped (10/16/19 0918)   lactated ringers 9 mL/hr Last Rate: Stopped (10/15/19 1836)     Continuous Infusions:  dextrose 5 % and sodium chloride 0.45 % 100 mL/hr Last Rate: Stopped (10/16/19 0918)   lactated ringers 9 mL/hr Last Rate: Stopped (10/15/19 1836)     PRN Meds:Morphine **AND** naloxone  •  Morphine **AND** naloxone  •  ondansetron **OR** ondansetron  •  oxyCODONE-acetaminophen  •  oxyCODONE-acetaminophen  •  promethazine **OR** promethazine  •  promethazine    Vital signs in last 24 hours:  Temp:  [97.4 °F (36.3 °C)-99.5 °F (37.5 °C)] 99.5 °F (37.5 °C)  Heart Rate:  [] 82  Resp:  [16-20] 16  BP: ()/(55-87) 97/55       Intake/Output:    Passed voiding trial    Exam:  Abdomen soft   Appropriately tender  Incisions clean dry  Bandages intact  Urethra somewhat edematous but easy insertion of catheter for instillation.    Data Review:  Lab Results (last 24 hours)     Procedure Component Value Units Date/Time    Tissue Pathology Exam [[id number]] Collected:  10/15/19 1223    Specimen:  Tissue from Fallopian Tube, Right; Tissue from Fallopian Tube, Left; Tissue from Vagina Updated:  10/17/19 6039     Case Report --     Surgical Pathology Report                         Case: [id number]                                  Authorizing Provider:  Jamila Rod MD        Collected:           10/15/2019 12:23 PM          Ordering Location:     [facility]  Received:            10/15/2019 09:04 PM                                 MAIN OR                                                 "                      Pathologist:           Jovita Alonzo MD                                                          Specimens:   1) - Fallopian Tube, Right, RIGHT PARA-TUBAL CYST OF FALLOPIAN TUBE                                 2) - Fallopian Tube, Left, LEFT PARA-TUBAL CYST OF FALLOPIAN TUBE                                   3) - Vagina, PORTION OF VAGINAL SLING                                                       Final Diagnosis --     1.  Fallopian Tube, Right, Segmental Resection:    A.  Benign fimbriated portion of fallopian tube with complete cross sections identified.    B.  Paratubal cyst formation.     2.  Fallopian Tube, Left, Segmental Resection:    A. Benign fimbriated and tubular portions of fallopian tube with complete cross sections identified.   B.  Paratubal cyst formation.      3.  Vaginal Sling, Removal (gross diagnosis only):   A.  Grossly identified synthetic mesh material embedded within dense fibrous tissue.    mec/brb       Gross Description --     1. The specimen is received in formalin labeled with the patient's name and further designated \"right paratubal cyst of fallopian tube\".  The specimen consists of a purple-gray segment of fallopian tube and fimbriated end.  The specimen consists predominately of fimbriae.  The tissue measures 3.0 x 2.3 x 1.2 cm.  The entire specimen is sectioned and submitted en toto in cassette 1.    2. The specimen is received in formalin labeled with the patient's name and further designated \"left paratubal cyst of fallopian tube\".  The specimen consists of two purple-tan segments of fallopian tube and fimbriated end.  The tissue measure 3.2 x 1.2 x 1.0 and 3.5 x 1.0 x 0.8 cm.  The second excision consists predominately of fimbriae.  Sectioning through the first tubal segment reveals a narrow pinpoint lumen.  The entire tubal segment is submitted en toto in cassette 2A.  The second segment of predominately fimbriae is sectioned and submitted in cassette " "2B.    3. The specimen is received in formalin labeled with the patient's name and further designated \"portion of vaginal sling\".  The specimen consists of two fragments of pink-tan soft tissue densely embedded with wirey synthetic mesh.  The tissue pieces measure 1.3 x 0.6 x 0.5 and 1.4 x 0.4 x 0.3 cm.  No soft tissue will be submitted for routine histology due to the densely embedded mesh.  Gross diagnosis only.    hbt/uso/jat/sandies       Microscopic Description --     Microscopic performed, incorporated in diagnosis.             Assessment:    Prolapse of vaginal vault after hysterectomy    Cystocele, midline    Cystocele, lateral    Vaginal enterocele    Rectocele    Loss of perineal body, female    Female stress incontinence    Intrinsic sphincter deficiency (ISD)    Chronic interstitial cystitis    Incomplete defecation    Incompetence or weakening of pubocervical tissue    Incompetence or weakening of rectovaginal tissue    High-tone pelvic floor dysfunction    Bladder pain    Disorder of urethra    Vaginal vault prolapse    Cyst of fallopian tube    POD #2  Doing well except issues of painful bladder syndrome  Good response to bladder instillation    Plan:  Has met criteria for discharge  Will send in oxybutinin 2.5 mg TID prn for home use  Follow up arrangements already made for next week  "

## 2019-10-17 NOTE — PROGRESS NOTES
Discharge Planning Assessment  [facility]     Patient Name: Nicki Aguilar  MRN: [id number]  Today's Date: 10/17/2019    Admit Date: 10/15/2019      Discharge Plan     Row Name 10/17/19 1625       Plan    Plan Comments  Discharge order, no discharge needs identified.    Final Discharge Disposition Code  01 - home or self-care     Expected Discharge Date and Time     Expected Discharge Date Expected Discharge Time    Oct 17, 2019      Estela Martinez RN

## 2019-10-19 NOTE — DISCHARGE SUMMARY
Physician Discharge Summary  Patient Identification:  Name: Nicki Aguilar  Age: 49 y.o.  Sex: female  :  1970  MRN: [id number]    Admit date: 10/15/2019    Discharge date and time: 10/17/2019  6:00 PM    Admitting Physician: Jamila Rod MD    Discharge Physician: Jamila Rod MD    Admission Diagnoses: Vaginal vault prolapse [N81.9]  Cyst of fallopian tube [N83.8]    Hospital Problems:   Principal Problem:    Prolapse of vaginal vault after hysterectomy  Active Problems:    Cystocele, midline    Cystocele, lateral    Vaginal enterocele    Rectocele    Loss of perineal body, female    Female stress incontinence    Intrinsic sphincter deficiency (ISD)    Chronic interstitial cystitis    Incomplete defecation    Incompetence or weakening of pubocervical tissue    Incompetence or weakening of rectovaginal tissue    High-tone pelvic floor dysfunction    Bladder pain    Disorder of urethra    Vaginal vault prolapse    Cyst of fallopian tube    Complication of mesh genitourinary      Discharge Diagnoses: same    Discharged Condition: good    Hospital Course: Surgery and post operative care      Disposition:  Home      Follow-up Information     Merlyn Salmon MD .    Specialty:  Internal Medicine  Contact information:  [address] [id number] [phone]                     Patient already has all post operative medications at home.  Copies on paper chart of medications and JACKIE report.  Patient already has follow up arrangements made.    Signed:  Jamila Rod MD  10/19/2019

## 2019-10-19 NOTE — OP NOTE
Operative Report     [facility] MAIN OR     Patient: Nicki Aguilar          :     1970     Date of Operation:  10/15/2019     PREOPERATIVE DIAGNOSES:  1. 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 Cystocele, N81.1, N81.12  3. Rectocele, N81.6  4. Enterocele, N81.5  5. 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 Weak Rectovaginal Tissue, N81.83  7. Weak pubourethral tissue, N81.82  8. Perineocele, N81.81  9. Stress urinary incontinence, N39.3  10. 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 Incomplete defecation, R15.0  12. High tone pelvic floor, N94.89  13. Urethral pain N36.9     POSTOPERATIVE DIAGNOSES:  same plus    1. Pain due to implanted mesh graft,T83.83XA  2. Other complication of genitourinary graft T83.89XA  3. Infection/inflammatory reaction due to genitourinary graft, T83.69XA           SURGEON:     Jamila Rod MD, FACOG, FACS     [facility]SIST[facility]:    [facility]      PROCEDURES:  1. Laparoscopic uterosacral ligament colpopexy, 56203  2. Laparoscopic paravaginal repair, 39392   3. Laparoscopic abdominal enterocele repair, 01119  4. Pubovaginal sling, 80660  5. Removal of pubovaginal sling, 97031  6. Laparoscopic bilateral salpingectomy, 36743  7. Posterior colporrhaphy, 40217  8. Extraperitoneal colpopexy sacrospinous ligament fixation, 49399  9. Placement posterior rectovaginal graft, 11228 add on  10. Placement of anterior pubourethral graft, 86669 add on  11. Cystourethroscopy      FINDINGS:    On cystourethroscopy, following all the procedures, there was bilateral efflux of Pyridium-stained urine from both the right and the left ureteral orifices. There were no lesions or foreign material of the bladder or the urethra.  The right and left ovaries appeared normal and remained in situ.    SPECIMENS: Right and left fallopian tubes and pubovaginal sling mesh    DESCRIPTION OF PROCEDURE:     The patient was taken to the Operating Room with a peripheral IV in place.  She underwent  the induction of general endotracheal anesthesia, was prepped and draped in the dorsal lithotomy position in [facility].  Cystourethroscopy showed no other lesions or foreign material of the bladder or the urethra.  There was bilateral efflux of Pyridium-stained urine from both the right and the left ureteral orifices.  The cystoscope was removed and a Holm catheter was placed and set to straight drainage. A left upper quadrant skin incision was made and a Veress needle was inserted and a pneumoperitoneum introduced.  The Veress needle was removed and a 5mm Optiview was placed in the left upper quadrant.  Under direct visualization, a left lateral 11mm and 5mm suprapubic, subumbilical and right lateral ports were placed.  The patient was placed in Trendelenburg and the bowel lifted superiorly.   The Harmonic scalpel was used to remove the left and the right fallopian tubes and these were went to pathology. The right and the left ovary appeared normal and remained in situ. With a Lucite dilator in the vagina, the pubocervical serosa was dissected from the pubocervical fascia, and the rectovaginal serosa was dissected from the rectovaginal fascia.  With the Breisky retractor in the vagina, #1 Prolene was used to go through the right uterosacral ligament, the right paravaginal fascia and held, #1 Prolene was used to go through the left uterosacral ligament, the left paravaginal fascia and both of these sutures were tied down to accomplish the laparoscopic paravaginal repair. With a Breisky retractor in the vagina, the uterosacral ligaments were placed on stretch and #1 Prolene was used to go through the right uterosacral ligament, the right rectovaginal fascia, the right pubocervical fascia and held. #1 Prolene was used to go through the left uterosacral ligament, the left rectovaginal fascia, the left pubocervical fascia and both these sutures were tied accomplishing the laparoscopic uterosacral ligament colpopexy.  #1  Prolene was used through the abdominal approach of a laparoscopic enterocele repair attaching the superiormost portion of the detached rectovaginal fascia and the pubocervical fascia at the apex of the vagina in the midline to the left uterosacral ligament and these sutures were tied laparoscopically. An additional suture was placed in the midline to close the enterocele and attached to the left uterosacral ligament to complete the laparoscopic abdominal enterocele repair. The cystourethroscopy showed no lesions or foreign material of the bladder or the urethra and there was bilateral efflux of Pyridium stained urine from both the right and left ureteral orifices. The cystoscope was removed and a Holm catheter was placed and set to straight drainage. The CO2 gas was allowed to escape and the left 11 millimeter port was closed with Jean-Paul Nogueira Sure Close with 0 Vicryl. The remainder of the ports were removed under direct visualization and all were hemostatic and these were closed with 4-0 Vicryl. A suburethral incision was made and dissected bilaterally and the area of the mesh was identified at the suburethral area and was contricting the urethra. The graft was divided in the midline and the vaginal portion of the graft removed and sent to pathology.  A sheet of porcine material was used to create a porcine sling. This retropubic 1 centimeter porcine sling was placed.  Cystourethroscopy with each transvaginal introducer in place showed no lesions or foreign material of the bladder or the urethra. The pubovaginal sling was adjusted without tension so that a curved Burton easily fit between the suburethral tissue and the tape. The excess suprapubic tape ends were trimmed and the skin lifted away. These incisions were closed with 4- 0 Vicryl suture. With a Holm catheter in place the suburethral incision was closed with 2-0 Vicryl and was hemostatic. A posterior vaginal incision was made and the rectum was dissected  from the vagina.  The sacrospinous ligaments were identified bilaterally and 0 permanent suture on a Capio Slim was placed in the right sacrospinous ligament and into the left sacrospinous ligament.  These sutures were attached to a T-shaped porcine graft and tied down to accomplish the extraperitoneal colpopexy sacrospinous ligament fixation. The inferior portion of the graft was tied down with 0 Vicryl.  Zero Vicryl interrupted sutures were used plicate the rectovaginal fascia in the midline to accomplish the posterior colporrhaphy and to reconstruct the perineal body.  The vaginal epithelium was closed with 2-0 Vicryl suture.   Cystourethroscopy showed bilateral efflux of Pyridium stained urine from both the right and left ureteral orifices. There were no lesions or foreign material of the bladder or the urethra.  Holm catheter was placed and set to straight drainage.   An estrogen permeated vaginal pack was placed into the vagina.  The patient was taken out of the dorsal lithotomy position, awakened from general anesthesia and taken to the Recovery Room in good condition.  All final needle, sponge, and instrument counts were reported as correct.  There were no complications to the procedure.     ESTIMATED BLOOD LOSS: 350 ML       URINE OUTPUT:    350 MILLILITERS    IV Fluids:  2000 milliliters             Jamila Rod MD, MSc, FACOG, FACS

## 2020-01-06 ENCOUNTER — DOCUMENTATION (OUTPATIENT)
Dept: SURGERY | Facility: CLINIC | Age: 50
End: 2020-01-06

## 2020-03-16 ENCOUNTER — OUTSIDE FACILITY SERVICE (OUTPATIENT)
Dept: SURGERY | Facility: CLINIC | Age: 50
End: 2020-03-16

## 2020-03-16 PROCEDURE — 45378 DIAGNOSTIC COLONOSCOPY: CPT | Performed by: SURGERY

## 2025-03-25 ENCOUNTER — TRANSCRIBE ORDERS (OUTPATIENT)
Dept: ADMINISTRATIVE | Facility: HOSPITAL | Age: 55
End: 2025-03-25
Payer: COMMERCIAL

## 2025-03-25 DIAGNOSIS — Z12.31 SCREENING MAMMOGRAM FOR BREAST CANCER: Primary | ICD-10-CM

## 2025-05-23 ENCOUNTER — HOSPITAL ENCOUNTER (OUTPATIENT)
Dept: MAMMOGRAPHY | Facility: HOSPITAL | Age: 55
Discharge: HOME | End: 2025-05-23
Admitting: INTERNAL MEDICINE
Payer: COMMERCIAL

## 2025-05-23 DIAGNOSIS — Z12.31 SCREENING MAMMOGRAM FOR BREAST CANCER: ICD-10-CM

## 2025-05-23 PROCEDURE — 77063 BREAST TOMOSYNTHESIS BI: CPT

## 2025-05-23 PROCEDURE — 77067 SCR MAMMO BI INCL CAD: CPT

## (undated) DEVICE — DRAPE,UNDERBUTTOCKS,PCH,STERILE: Brand: MEDLINE

## (undated) DEVICE — IRR EAR 2OZ

## (undated) DEVICE — ST IRR CYSTO W/SPK 77IN LF

## (undated) DEVICE — SOL NACL 0.9PCT 1000ML

## (undated) DEVICE — TOWEL,OR,DSP,ST,BLUE,STD,4/PK,20PK/CS: Brand: MEDLINE

## (undated) DEVICE — DEV SUT GRSPR CLOSUR 15CM 14G

## (undated) DEVICE — RUBBERBAND LF STRL PK/2

## (undated) DEVICE — SHEARS WITH ROTICULATOR TECHNOLOGY: Brand: ENDO MINI-SHEARS

## (undated) DEVICE — RETR RNG GEN2 31.8X18.3CM

## (undated) DEVICE — LOU GYN LAPAROSCOPY: Brand: MEDLINE INDUSTRIES, INC.

## (undated) DEVICE — 2, DISPOSABLE SUCTION/IRRIGATOR WITH DISPOSABLE TIP: Brand: STRYKEFLOW

## (undated) DEVICE — GAUZE,SPONGE,4"X4",16PLY,XRAY,STRL,LF: Brand: MEDLINE

## (undated) DEVICE — SUT VIC 0 CT 36IN J958H

## (undated) DEVICE — SUT MNCRYL PLS ANTIB UD 4/0 PS2 18IN

## (undated) DEVICE — SUT VIC 2/0 CT2 27IN J269H

## (undated) DEVICE — ADHS SKIN DERMABOND TOP ADVANCED

## (undated) DEVICE — GLV SURG BIOGEL LTX PF 6 1/2

## (undated) DEVICE — 1842 FOAM BLOCK NEEDLE COUNTER: Brand: DEVON

## (undated) DEVICE — CATHETER,FOLEY,SILI-ELAST,LTX,16FR,30ML: Brand: MEDLINE

## (undated) DEVICE — PENCL E/S HNDSWCH ROCKR CB

## (undated) DEVICE — PUNCH BIOP 2MM

## (undated) DEVICE — SUT PA DBL ARM TC22 T1/2CR 0 36IN

## (undated) DEVICE — ANTIBACTERIAL UNDYED BRAIDED (POLYGLACTIN 910), SYNTHETIC ABSORBABLE SUTURE: Brand: COATED VICRYL

## (undated) DEVICE — BLADE SHIELD SCALPEL HOLDER: Brand: DEVON

## (undated) DEVICE — SUT VIC 0 TIES 18IN J912G

## (undated) DEVICE — HARMONIC ACE +7 LAPAROSCOPIC SHEARS ADVANCED HEMOSTASIS 5MM DIAMETER 36CM SHAFT LENGTH  FOR USE WITH GRAY HAND PIECE ONLY: Brand: HARMONIC ACE

## (undated) DEVICE — CONTN STRL 32OZ

## (undated) DEVICE — TUBING, SUCTION, 1/4" X 20', STRAIGHT: Brand: MEDLINE INDUSTRIES, INC.

## (undated) DEVICE — DRAPE,REIN 53X77,STERILE: Brand: MEDLINE

## (undated) DEVICE — ENDOPATH XCEL UNIVERSAL TROCAR STABLILITY SLEEVES: Brand: ENDOPATH XCEL

## (undated) DEVICE — SYR CONTRL LUERLOK 10CC

## (undated) DEVICE — SKIN PREP TRAY W/CHG: Brand: MEDLINE INDUSTRIES, INC.

## (undated) DEVICE — LEGGINGS, PAIR, CLEAR, STERILE: Brand: MEDLINE

## (undated) DEVICE — DRSNG WND BORDR/ADHS NONADHR/GZ LF 2X2IN STRL

## (undated) DEVICE — TOTAL TRAY, 16FR 10ML SIL FOLEY, URN: Brand: MEDLINE

## (undated) DEVICE — 3M™ STERI-DRAPE™ INSTRUMENT POUCH 1018: Brand: STERI-DRAPE™

## (undated) DEVICE — SINGLE BASIN: Brand: CARDINAL HEALTH

## (undated) DEVICE — COVER,MAYO STAND,STERILE: Brand: MEDLINE

## (undated) DEVICE — RETR STAY HK ELAS SHRP 5MM PK/8

## (undated) DEVICE — SUT PROLN 1 CT 30IN 8435H

## (undated) DEVICE — 3M™ STERI-DRAPE™ INSTRUMENT POUCH 1018L: Brand: STERI-DRAPE™

## (undated) DEVICE — SUT VIC 0 CT1 36IN J946H

## (undated) DEVICE — IRRIGATOR BULB ASEPTO 60CC STRL

## (undated) DEVICE — MAT FLR ABSORBENT LG 4FT 10 2.5FT

## (undated) DEVICE — TOWEL,OR,DSP,ST,NATURAL,DLX,4/PK,20PK/CS: Brand: MEDLINE

## (undated) DEVICE — DEV CAPTURE SUT CAPIO/SLIM 11MM 25CM

## (undated) DEVICE — TROCAR: Brand: KII OPTICAL ACCESS SYSTEM

## (undated) DEVICE — ENDOPATH XCEL BLADELESS TROCARS WITH STABILITY SLEEVES: Brand: ENDOPATH XCEL

## (undated) DEVICE — APPL CHLORAPREP W/TINT 26ML ORNG

## (undated) DEVICE — INTENDED FOR TISSUE SEPARATION, AND OTHER PROCEDURES THAT REQUIRE A SHARP SURGICAL BLADE TO PUNCTURE OR CUT.: Brand: BARD-PARKER ® CARBON RIB-BACK BLADES

## (undated) DEVICE — LAPAROSCOPIC SMOKE ELIMINATION DEVICE: Brand: PNEUVIEW XE

## (undated) DEVICE — MEDI-VAC YANKAUER SUCTION HANDLE W/BULBOUS TIP: Brand: CARDINAL HEALTH

## (undated) DEVICE — NDL HYPO PRECISIONGLIDE REG 25G 1 1/2

## (undated) DEVICE — VAGINAL PACKING: Brand: DEROYAL

## (undated) DEVICE — DEV COND GAS LAP INSUFLOW W/LUER CONN

## (undated) DEVICE — SPNG LAP 18X18IN LF STRL PK/5

## (undated) DEVICE — CARDIOVASCULAR SPLIT DRAPE II, OPTIMA: Brand: CONVERTORS

## (undated) DEVICE — ENDOPATH PNEUMONEEDLE INSUFFLATION NEEDLES WITH LUER LOCK CONNECTORS 120MM: Brand: ENDOPATH

## (undated) DEVICE — 1010 S-DRAPE TOWEL DRAPE 10/BX: Brand: STERI-DRAPE™